# Patient Record
Sex: FEMALE | Race: WHITE | NOT HISPANIC OR LATINO | Employment: FULL TIME | URBAN - METROPOLITAN AREA
[De-identification: names, ages, dates, MRNs, and addresses within clinical notes are randomized per-mention and may not be internally consistent; named-entity substitution may affect disease eponyms.]

---

## 2019-11-22 ENCOUNTER — OFFICE VISIT (OUTPATIENT)
Dept: PRIMARY CARE CLINIC | Facility: CLINIC | Age: 54
End: 2019-11-22
Payer: COMMERCIAL

## 2019-11-22 VITALS
BODY MASS INDEX: 23.71 KG/M2 | HEIGHT: 64 IN | OXYGEN SATURATION: 97 % | WEIGHT: 138.88 LBS | SYSTOLIC BLOOD PRESSURE: 116 MMHG | HEART RATE: 77 BPM | TEMPERATURE: 98 F | DIASTOLIC BLOOD PRESSURE: 80 MMHG

## 2019-11-22 DIAGNOSIS — Z23 NEED FOR VACCINATION: Primary | ICD-10-CM

## 2019-11-22 DIAGNOSIS — Z00.00 ANNUAL PHYSICAL EXAM: ICD-10-CM

## 2019-11-22 DIAGNOSIS — K21.9 GERD WITHOUT ESOPHAGITIS: ICD-10-CM

## 2019-11-22 PROCEDURE — 99396 PR PREVENTIVE VISIT,EST,40-64: ICD-10-PCS | Mod: 25,S$GLB,, | Performed by: INTERNAL MEDICINE

## 2019-11-22 PROCEDURE — 99999 PR PBB SHADOW E&M-NEW PATIENT-LVL III: ICD-10-PCS | Mod: PBBFAC,,, | Performed by: INTERNAL MEDICINE

## 2019-11-22 PROCEDURE — 90471 TDAP VACCINE GREATER THAN OR EQUAL TO 7YO IM: ICD-10-PCS | Mod: S$GLB,,, | Performed by: INTERNAL MEDICINE

## 2019-11-22 PROCEDURE — 99999 PR PBB SHADOW E&M-NEW PATIENT-LVL III: CPT | Mod: PBBFAC,,, | Performed by: INTERNAL MEDICINE

## 2019-11-22 PROCEDURE — 90715 TDAP VACCINE GREATER THAN OR EQUAL TO 7YO IM: ICD-10-PCS | Mod: S$GLB,,, | Performed by: INTERNAL MEDICINE

## 2019-11-22 PROCEDURE — 99396 PREV VISIT EST AGE 40-64: CPT | Mod: 25,S$GLB,, | Performed by: INTERNAL MEDICINE

## 2019-11-22 PROCEDURE — 90471 IMMUNIZATION ADMIN: CPT | Mod: S$GLB,,, | Performed by: INTERNAL MEDICINE

## 2019-11-22 PROCEDURE — 90715 TDAP VACCINE 7 YRS/> IM: CPT | Mod: S$GLB,,, | Performed by: INTERNAL MEDICINE

## 2019-11-22 RX ORDER — ACETAMINOPHEN AND PHENYLEPHRINE HCL 325; 5 MG/1; MG/1
1 TABLET ORAL DAILY
COMMUNITY
End: 2023-06-23

## 2019-11-22 RX ORDER — CHOLECALCIFEROL (VITAMIN D3) 50 MCG
1 TABLET ORAL DAILY
COMMUNITY
End: 2022-02-03

## 2019-11-22 RX ORDER — PANTOPRAZOLE SODIUM 40 MG/1
1 TABLET, DELAYED RELEASE ORAL DAILY
COMMUNITY
Start: 2019-11-18 | End: 2021-04-23 | Stop reason: ALTCHOICE

## 2019-11-22 NOTE — PROGRESS NOTES
[facility] Note    Chief Complaint      Chief Complaint   Patient presents with    Annual Exam       History of Present Illness      Melina Jean is a 54 y.o. female with chronic conditions of GERD who presents today for: re-establish care from  and annual preventative visit.  GERD: Controlled on protonix daily.  Had EGD yesterday with Dr. Cerrato.    Diet: Prepares own food mostly.  Limiting fatty foods and carbs.  Drinks plenty water.    Exercise: stays active.  Spin class 2x/week.    Denies drinking and driving, drinking more than 4 drinks on occasion, drug use.     Flu shot UTD.  Td unsure.  Shingrix UTD.  Pneumonia vaccine due age 67.  Mammogram 2019.  Sees Dr. Qureshi.  DEXA UTD 2018.  Requesting record.  Cscope Dr. Cerrato, 2019, no polyps, 5 yr interval for family history of colon cancer in mother.      Past Medical History:  No past medical history on file.    Past Surgical History:   has a past surgical history that includes Cholecystectomy and Tonsillectomy.    Family History:  family history includes Colon cancer in her mother; No Known Problems in her brother and father.     Social History:  Social History     Tobacco Use    Smoking status: Never Smoker   Substance Use Topics    Alcohol use: Not on file    Drug use: Not on file       Review of Systems   HENT: Negative for hearing loss.    Eyes: Negative for discharge.   Respiratory: Negative for wheezing.    Cardiovascular: Negative for chest pain and palpitations.   Gastrointestinal: Negative for blood in stool, constipation, diarrhea and vomiting.   Genitourinary: Negative for dysuria and hematuria.   Musculoskeletal: Negative for neck pain.   Neurological: Negative for weakness and headaches.   Endo/Heme/Allergies: Negative for polydipsia.        Medications:  Outpatient Encounter Medications as of 11/22/2019   Medication Sig Dispense Refill    biotin 10,000 mcg Cap Take 1 capsule by mouth once daily.      CALCIUM CITRATE  "ORAL Take 300 mg by mouth once daily.      calcium polycarbophil (FIBERCON ORAL) Take 1 tablet by mouth once daily.      cholecalciferol, vitamin D3, (VITAMIN D3) 2,000 unit Tab Take 1 tablet by mouth once daily.      pantoprazole (PROTONIX) 40 MG tablet Take 1 tablet by mouth once daily.       No facility-administered encounter medications on file as of 11/22/2019.        Allergies:  Review of patient's allergies indicates:  No Known Allergies    Health Maintenance:  Immunization History   Administered Date(s) Administered    Zoster Recombinant 11/28/2018      Health Maintenance   Topic Date Due    Lipid Panel  1965    TETANUS VACCINE  06/21/1985    Pap Smear with HPV Cotest  06/21/1988    Mammogram  06/21/2007    Colonoscopy  06/21/2017        Physical Exam      Vital Signs  Temp: 98 °F (36.7 °C)  Temp src: Oral  Pulse: 77  SpO2: 97 %  BP: 116/80  BP Location: Left arm  Patient Position: Sitting  Pain Score: 0-No pain  Height and Weight  Height: 5' 3.5" (161.3 cm)  Weight: 63 kg (138 lb 14.2 oz)  BSA (Calculated - sq m): 1.68 sq meters  BMI (Calculated): 24.2  Weight in (lb) to have BMI = 25: 143.1]    Physical Exam   Constitutional: She appears well-developed and well-nourished.   HENT:   Head: Normocephalic and atraumatic.   Right Ear: External ear normal.   Left Ear: External ear normal.   Mouth/Throat: Oropharynx is clear and moist.   Eyes: Pupils are equal, round, and reactive to light. Conjunctivae and EOM are normal.   Neck: Carotid bruit is not present.   Cardiovascular: Normal rate, regular rhythm, normal heart sounds and intact distal pulses.   No murmur heard.  Pulmonary/Chest: Effort normal and breath sounds normal. She has no wheezes. She has no rales.   Abdominal: Soft. Bowel sounds are normal. She exhibits no distension. There is no hepatosplenomegaly. There is no tenderness.   Musculoskeletal: She exhibits no edema.   Vitals reviewed.       Laboratory:  CBC:      CMP:        Invalid " input(s): CREATININ  URINALYSIS:       LIPIDS:      TSH:      A1C:        Assessment/Plan     Melina Jean is a 54 y.o.female with:    1. Annual physical exam  - Basic metabolic panel; Future  - Lipid panel; Future  - TSH; Future  - T4, free; Future  - Basic metabolic panel  - Lipid panel  - TSH  - T4, free  Discussed diet and exercise, vaccines and cancer screening, risk factors.  Screening labs ordered.     2. GERD without esophagitis  - Basic metabolic panel; Future  - Lipid panel; Future  - TSH; Future  - T4, free; Future  - Basic metabolic panel  - Lipid panel  - TSH  - T4, free  Continue current meds.  F/U with DR. Cerrato  3. Need for vaccination  - Tdap Vaccine       Chronic conditions status updated as per HPI.  Other than changes above, cont current medications and maintain follow up with specialists.  Return to clinic in 12 months.    Eliud Gerard MD  [facility]                Answers for HPI/ROS submitted by the patient on 11/21/2019   activity change: No  unexpected weight change: No  rhinorrhea: No  trouble swallowing: No  visual disturbance: No  chest tightness: No  polyuria: No  difficulty urinating: No  menstrual problem: No  joint swelling: No  arthralgias: No  confusion: No  dysphoric mood: No

## 2019-11-22 NOTE — PROGRESS NOTES
"Patient was given vaccine information sheet for the Tdap immunization. The area of injection was palpated using the acromion process as a landmark. This area was cleaned with alcohol. Using a 25g 1" safety needle, 0.5mL of the vaccine was placed into the left muscle. The injection site was dressed with a bandage. Patient experienced no complications and was discharged in stable condition. Tdap Lot: [id number] Exp: 05/15/2021.  Roz Becerril LPN        "

## 2019-11-24 LAB
CHOLEST SERPL-MCNC: 170 MG/DL
CHOLEST/HDLC SERPL: 2.3 (CALC)
HDLC SERPL-MCNC: 75 MG/DL
LDLC SERPL CALC-MCNC: 81 MG/DL (CALC)
NONHDLC SERPL-MCNC: 95 MG/DL (CALC)
T4 FREE SERPL-MCNC: 1.1 NG/DL (ref 0.8–1.8)
TRIGL SERPL-MCNC: 68 MG/DL
TSH SERPL-ACNC: 1.35 MIU/L

## 2019-11-25 ENCOUNTER — PATIENT OUTREACH (OUTPATIENT)
Dept: ADMINISTRATIVE | Facility: HOSPITAL | Age: 54
End: 2019-11-25

## 2019-11-27 ENCOUNTER — TELEPHONE (OUTPATIENT)
Dept: FAMILY MEDICINE | Facility: CLINIC | Age: 54
End: 2019-11-27

## 2019-11-27 NOTE — TELEPHONE ENCOUNTER
----- Message from Roxanne Jimenez sent at 11/27/2019  2:19 PM CST -----  Contact: Jacob Demarco missed a call from staff. Pt is request a call back on her cell [phone]

## 2020-01-03 ENCOUNTER — HOSPITAL ENCOUNTER (OUTPATIENT)
Dept: RADIOLOGY | Facility: HOSPITAL | Age: 55
Discharge: HOME | End: 2020-01-03
Attending: INTERNAL MEDICINE
Payer: COMMERCIAL

## 2020-01-03 ENCOUNTER — TELEPHONE (OUTPATIENT)
Dept: FAMILY MEDICINE | Facility: CLINIC | Age: 55
End: 2020-01-03

## 2020-01-03 ENCOUNTER — OFFICE VISIT (OUTPATIENT)
Dept: PRIMARY CARE CLINIC | Facility: CLINIC | Age: 55
End: 2020-01-03
Payer: COMMERCIAL

## 2020-01-03 VITALS
HEART RATE: 80 BPM | HEIGHT: 64 IN | SYSTOLIC BLOOD PRESSURE: 114 MMHG | OXYGEN SATURATION: 98 % | BODY MASS INDEX: 24.62 KG/M2 | DIASTOLIC BLOOD PRESSURE: 82 MMHG | WEIGHT: 144.19 LBS | TEMPERATURE: 98 F

## 2020-01-03 DIAGNOSIS — M10.9 GOUT, UNSPECIFIED CAUSE, UNSPECIFIED CHRONICITY, UNSPECIFIED SITE: ICD-10-CM

## 2020-01-03 DIAGNOSIS — M79.672 BILATERAL FOOT PAIN: ICD-10-CM

## 2020-01-03 DIAGNOSIS — M79.671 BILATERAL FOOT PAIN: ICD-10-CM

## 2020-01-03 DIAGNOSIS — M79.645 PAIN OF FINGER OF LEFT HAND: Primary | ICD-10-CM

## 2020-01-03 DIAGNOSIS — M79.645 PAIN OF FINGER OF LEFT HAND: ICD-10-CM

## 2020-01-03 PROCEDURE — 73630 X-RAY EXAM OF FOOT: CPT | Mod: 50,TC,PN

## 2020-01-03 PROCEDURE — 73130 X-RAY EXAM OF HAND: CPT | Mod: TC,PN,LT

## 2020-01-03 PROCEDURE — 73630 X-RAY EXAM OF FOOT: CPT | Mod: 26,,, | Performed by: RADIOLOGY

## 2020-01-03 PROCEDURE — 3008F BODY MASS INDEX DOCD: CPT | Mod: CPTII,S$GLB,, | Performed by: INTERNAL MEDICINE

## 2020-01-03 PROCEDURE — 73630 XR FOOT COMPLETE 3 VIEW BILATERAL: ICD-10-PCS | Mod: 26,,, | Performed by: RADIOLOGY

## 2020-01-03 PROCEDURE — 99213 OFFICE O/P EST LOW 20 MIN: CPT | Mod: S$GLB,,, | Performed by: INTERNAL MEDICINE

## 2020-01-03 PROCEDURE — 99999 PR PBB SHADOW E&M-EST. PATIENT-LVL III: ICD-10-PCS | Mod: PBBFAC,,, | Performed by: INTERNAL MEDICINE

## 2020-01-03 PROCEDURE — 99999 PR PBB SHADOW E&M-EST. PATIENT-LVL III: CPT | Mod: PBBFAC,,, | Performed by: INTERNAL MEDICINE

## 2020-01-03 PROCEDURE — 3008F PR BODY MASS INDEX (BMI) DOCUMENTED: ICD-10-PCS | Mod: CPTII,S$GLB,, | Performed by: INTERNAL MEDICINE

## 2020-01-03 PROCEDURE — 73130 X-RAY EXAM OF HAND: CPT | Mod: 26,LT,, | Performed by: RADIOLOGY

## 2020-01-03 PROCEDURE — 73130 XR HAND COMPLETE 3 VIEW LEFT: ICD-10-PCS | Mod: 26,LT,, | Performed by: RADIOLOGY

## 2020-01-03 PROCEDURE — 99213 PR OFFICE/OUTPT VISIT, EST, LEVL III, 20-29 MIN: ICD-10-PCS | Mod: S$GLB,,, | Performed by: INTERNAL MEDICINE

## 2020-01-03 NOTE — PROGRESS NOTES
[facility] Note    Chief Complaint      Chief Complaint   Patient presents with    Joint pain     left thumb and both feet       History of Present Illness      Melina Jean is a 54 y.o. female with chronic conditions of GERD who presents today for: complaints of joint pains.  Complains of left thumb PIP swelling.  Associated with pain with ROM and tenderness to touch.  Also having similar symptoms with bilateral great toes, MTPs.  Has never had gout diagnosed previously.  Does not eat shellfish regularly.  Does drink beer and had some day prior to toes getting inflamed.        Past Medical History:  No past medical history on file.    Past Surgical History:   has a past surgical history that includes Cholecystectomy and Tonsillectomy.    Family History:  family history includes COPD in her mother; Cancer in her mother; Colon cancer in her mother; No Known Problems in her brother and father.     Social History:  Social History     Tobacco Use    Smoking status: Never Smoker   Substance Use Topics    Alcohol use: Yes     Alcohol/week: 5.0 standard drinks     Types: 1 Glasses of wine, 3 Cans of beer, 1 Standard drinks or equivalent per week     Comment: Socially not every week    Drug use: Never       Review of Systems   Constitutional: Negative for chills, fever and malaise/fatigue.   HENT: Negative for hearing loss.    Eyes: Negative for discharge.   Respiratory: Negative for shortness of breath and wheezing.    Cardiovascular: Negative for chest pain and palpitations.   Gastrointestinal: Negative for blood in stool, constipation, diarrhea, nausea and vomiting.   Genitourinary: Negative for dysuria and hematuria.   Musculoskeletal: Positive for joint pain. Negative for neck pain.   Skin: Negative for rash.   Neurological: Negative for weakness and headaches.   Endo/Heme/Allergies: Negative for polydipsia.        Medications:  Outpatient Encounter Medications as of 1/3/2020   Medication Sig  "Dispense Refill    biotin 10,000 mcg Cap Take 1 capsule by mouth once daily.      CALCIUM CITRATE ORAL Take 300 mg by mouth once daily.      calcium polycarbophil (FIBERCON ORAL) Take 1 tablet by mouth once daily.      cholecalciferol, vitamin D3, (VITAMIN D3) 2,000 unit Tab Take 1 tablet by mouth once daily.      pantoprazole (PROTONIX) 40 MG tablet Take 1 tablet by mouth once daily.       No facility-administered encounter medications on file as of 1/3/2020.        Allergies:  Review of patient's allergies indicates:  No Known Allergies    Health Maintenance:  Immunization History   Administered Date(s) Administered    Influenza - Quadrivalent 10/03/2017    Influenza - Quadrivalent - PF (6 months and older) 09/24/2019    Tdap 11/22/2019    Zoster Recombinant 09/27/2018, 11/29/2018      Health Maintenance   Topic Date Due    Pap Smear with HPV Cotest  06/21/1988    Mammogram  12/18/2020    Colonoscopy  11/21/2024    Lipid Panel  11/23/2024    TETANUS VACCINE  11/22/2029        Physical Exam      Vital Signs  Temp: 98.4 °F (36.9 °C)  Temp src: Oral  Pulse: 80  SpO2: 98 %  BP: 114/82  BP Location: Right arm  Patient Position: Sitting  Pain Score: 0-No pain  Height and Weight  Height: 5' 3.5" (161.3 cm)  Weight: 65.4 kg (144 lb 2.9 oz)  BSA (Calculated - sq m): 1.71 sq meters  BMI (Calculated): 25.1  Weight in (lb) to have BMI = 25: 143.1]    Physical Exam   Constitutional: She appears well-developed and well-nourished.   HENT:   Head: Normocephalic and atraumatic.   Right Ear: External ear normal.   Left Ear: External ear normal.   Mouth/Throat: Oropharynx is clear and moist.   Cardiovascular: Normal rate, regular rhythm and normal heart sounds.   No murmur heard.  Pulmonary/Chest: Effort normal and breath sounds normal. She has no wheezes. She has no rales.   Abdominal: Soft. Bowel sounds are normal. She exhibits no distension. There is no tenderness.   Musculoskeletal:        Left hand: She exhibits " tenderness (Swollen, tender DIP Left 1st digit). She exhibits normal range of motion and normal capillary refill.   Bilateral great toes normal in appearance, no tenderness, no erythema, no swelling   Vitals reviewed.       Laboratory:  CBC:      CMP:        Invalid input(s): CREATININ  URINALYSIS:       LIPIDS:  Recent Labs   Lab 11/23/19  0813   TSH 1.35   HDL 75   Cholesterol 170   Triglycerides 68   LDL Cholesterol 81   Hdl/Cholesterol Ratio 2.3   Non HDL Chol. (LDL+VLDL) 95     TSH:  Recent Labs   Lab 11/23/19 0813   TSH 1.35     A1C:        Assessment/Plan     Melina Jean is a 54 y.o.female with:    1. Pain of finger of left hand  - X-Ray Hand Complete Left; Future  - Uric acid; Future  - Uric acid    2. Bilateral foot pain  - X-Ray Foot Complete Bilateral; Future  - Uric acid; Future  - Uric acid    3. Gout, unspecified cause, unspecified chronicity, unspecified site  - X-Ray Foot Complete Bilateral; Future  - X-Ray Hand Complete Left; Future  - Uric acid; Future  - Uric acid       Will check uric acid and xrays.  COunswelled on low purine diet in the meantime.  Ok to use aleve as needed until etiology is discovered.  RTC as scheduled.      Eliud Gerard MD  [facility]                Answers for HPI/ROS submitted by the patient on 1/1/2020   activity change: No  unexpected weight change: No  rhinorrhea: No  trouble swallowing: No  visual disturbance: No  chest tightness: No  polyuria: No  difficulty urinating: No  menstrual problem: No  joint swelling: Yes  arthralgias: Yes  confusion: No  dysphoric mood: No

## 2020-01-03 NOTE — TELEPHONE ENCOUNTER
----- Message from Venessa Krishnan sent at 1/3/2020  4:32 PM CST -----  Contact: [phone]/self  Patient states she is returning your call. Please advise.

## 2020-01-03 NOTE — PROGRESS NOTES
Xray of the hand shows mild arthritis at the base of the thumb but nothing significant at the inflamed joint of the thumb.  This makes gout more likely.  Once we get the results of the uric acid to complete the workup, we can then recommend treatments.

## 2020-01-04 LAB — URATE SERPL-MCNC: 3.6 MG/DL (ref 2.5–7)

## 2020-01-06 DIAGNOSIS — M79.672 BILATERAL FOOT PAIN: ICD-10-CM

## 2020-01-06 DIAGNOSIS — M79.645 PAIN OF FINGER OF LEFT HAND: Primary | ICD-10-CM

## 2020-01-06 DIAGNOSIS — M79.671 BILATERAL FOOT PAIN: ICD-10-CM

## 2020-01-06 NOTE — PROGRESS NOTES
Uric acid is normal at this time.  This makes it less likely to be gout but not impossible.  It is possible that uric acid levels rise depending on what she is eating and therefore finger and toe pain could still be gout.  I would recommend taking aleve OTC 1 tablet twice daily for 7 days to calm down inflammation and if that improves finger pain, then use aleve as needed.  Try adhering to low purine diet as discussed during office visit.  Recommend to recheck uric acid in 1-3 months.

## 2020-03-10 ENCOUNTER — PATIENT MESSAGE (OUTPATIENT)
Dept: PRIMARY CARE CLINIC | Facility: CLINIC | Age: 55
End: 2020-03-10

## 2020-03-12 RX ORDER — CLONIDINE HYDROCHLORIDE 0.2 MG/1
0.2 TABLET ORAL ONCE
Qty: 30 TABLET | Refills: 0
Start: 2020-03-12 | End: 2021-02-11

## 2020-04-13 ENCOUNTER — OFFICE VISIT (OUTPATIENT)
Dept: PRIMARY CARE CLINIC | Facility: CLINIC | Age: 55
End: 2020-04-13
Payer: COMMERCIAL

## 2020-04-13 DIAGNOSIS — M10.9 GOUT, UNSPECIFIED CAUSE, UNSPECIFIED CHRONICITY, UNSPECIFIED SITE: Primary | ICD-10-CM

## 2020-04-13 DIAGNOSIS — R22.1 LOCALIZED SWELLING, MASS OR LUMP OF NECK: ICD-10-CM

## 2020-04-13 PROCEDURE — 99213 OFFICE O/P EST LOW 20 MIN: CPT | Mod: 95,,, | Performed by: INTERNAL MEDICINE

## 2020-04-13 PROCEDURE — 99213 PR OFFICE/OUTPT VISIT, EST, LEVL III, 20-29 MIN: ICD-10-PCS | Mod: 95,,, | Performed by: INTERNAL MEDICINE

## 2020-04-13 RX ORDER — HYDROCODONE BITARTRATE AND ACETAMINOPHEN 5; 325 MG/1; MG/1
1 TABLET ORAL EVERY 12 HOURS PRN
COMMUNITY
End: 2020-04-13 | Stop reason: SDUPTHER

## 2020-04-13 RX ORDER — HYDROCODONE BITARTRATE AND ACETAMINOPHEN 5; 325 MG/1; MG/1
1 TABLET ORAL EVERY 12 HOURS PRN
Qty: 10 TABLET | Refills: 0 | Status: SHIPPED | OUTPATIENT
Start: 2020-04-13 | End: 2021-02-11

## 2020-04-13 NOTE — PROGRESS NOTES
[facility] Virtual Visit Note    The patient location is: Home  The chief complaint leading to consultation is: right swollen cervical lymph node  Visit type: Virtual visit with synchronous audio and video  Total time spent with patient: 15 min  Each patient to whom he or she provides medical services by telemedicine is:  (1) informed of the relationship between the physician and patient and the respective role of any other health care provider with respect to management of the patient; and (2) notified that he or she may decline to receive medical services by telemedicine and may withdraw from such care at any time.      Chief Complaint      Chief Complaint   Patient presents with    Neck Pain       History of Present Illness      Melina Jean is a 54 y.o. female with chronic conditions of GERD who presents today for: right anterior cervical lymph node swelling and tenderness. Has  Present for the past 3-4 days.  Seems to be improving in the past 24-48 hours.  No associated fevers, difficulty swallowing, cough, shortness of breath.  Pt did have sinusitis treated by ENT with antibiotics in early March.  Was not tested for COVID at that time due to restricted indications.    Pt also has prescription for hydrocodone for low back pain and gout exacerbations.  Has run out and asking for refill.  Last refill 2018.      Past Medical History:  History reviewed. No pertinent past medical history.    Past Surgical History:   has a past surgical history that includes Cholecystectomy and Tonsillectomy.    Family History:  family history includes COPD in her mother; Cancer in her mother; Colon cancer in her mother; No Known Problems in her brother and father.     Social History:  Social History     Tobacco Use    Smoking status: Never Smoker   Substance Use Topics    Alcohol use: Yes     Alcohol/week: 5.0 standard drinks     Types: 1 Glasses of wine, 3 Cans of beer, 1 Standard drinks or equivalent per week      Comment: Socially not every week    Drug use: Never       Review of Systems   Constitutional: Negative for chills, fever and malaise/fatigue.   HENT:        Enlarged right anterior cervical lymph node   Respiratory: Negative for shortness of breath.    Cardiovascular: Negative for chest pain.   Gastrointestinal: Negative for constipation, diarrhea, nausea and vomiting.   Musculoskeletal: Positive for neck pain.   Skin: Negative for rash.   Neurological: Negative for weakness.        Medications:  Outpatient Encounter Medications as of 4/13/2020   Medication Sig Dispense Refill    HYDROcodone-acetaminophen (NORCO) 5-325 mg per tablet Take 1 tablet by mouth every 12 (twelve) hours as needed. 10 tablet 0    [DISCONTINUED] HYDROcodone-acetaminophen (NORCO) 5-325 mg per tablet Take 1 tablet by mouth every 12 (twelve) hours as needed.      biotin 10,000 mcg Cap Take 1 capsule by mouth once daily.      CALCIUM CITRATE ORAL Take 300 mg by mouth once daily.      calcium polycarbophil (FIBERCON ORAL) Take 1 tablet by mouth once daily.      cholecalciferol, vitamin D3, (VITAMIN D3) 2,000 unit Tab Take 1 tablet by mouth once daily.      cloNIDine (CATAPRES) 0.2 MG tablet Take 1 tablet (0.2 mg total) by mouth once. for 1 dose 30 tablet 0    pantoprazole (PROTONIX) 40 MG tablet Take 1 tablet by mouth once daily.       No facility-administered encounter medications on file as of 4/13/2020.        Allergies:  Review of patient's allergies indicates:  No Known Allergies    Health Maintenance:  Immunization History   Administered Date(s) Administered    Influenza - Quadrivalent 10/03/2017    Influenza - Quadrivalent - PF (6 months and older) 09/24/2019    Tdap 11/22/2019    Zoster Recombinant 09/27/2018, 11/29/2018      Health Maintenance   Topic Date Due    Pap Smear with HPV Cotest  06/21/1988    Mammogram  12/18/2020    Colonoscopy  11/21/2024    Lipid Panel  11/23/2024    TETANUS VACCINE  11/22/2029         Physical Exam       ]    Physical Exam   Constitutional: She is oriented to person, place, and time. She appears well-developed and well-nourished. No distress.   Eyes: Conjunctivae and EOM are normal. Right eye exhibits no discharge. Left eye exhibits no discharge. No scleral icterus.   Pulmonary/Chest: Effort normal. No respiratory distress.   Lymphadenopathy:     She has cervical adenopathy (Pt localizes right anterior cervical swelling, estimates size 1-2 inches, firm, slightly tender, mobile).   Neurological: She is alert and oriented to person, place, and time.   Skin: She is not diaphoretic.   Psychiatric: She has a normal mood and affect. Her behavior is normal. Judgment and thought content normal.        Laboratory:  CBC:      CMP:        Invalid input(s): CREATININ  URINALYSIS:       LIPIDS:  Recent Labs   Lab 11/23/19  0813   TSH 1.35   HDL 75   Cholesterol 170   Triglycerides 68   LDL Cholesterol 81   Hdl/Cholesterol Ratio 2.3   Non HDL Chol. (LDL+VLDL) 95     TSH:  Recent Labs   Lab 11/23/19  0813   TSH 1.35     A1C:        Assessment/Plan     Melina Jean is a 54 y.o.female with:    1. Localized swelling, mass or lump of neck, new to me  Cont to monitor.  Seems to be improving.  If still present in 1 month, will send for ultrasound.  2. Gout, unspecified cause, unspecified chronicity, unspecified site  - HYDROcodone-acetaminophen (NORCO) 5-325 mg per tablet; Take 1 tablet by mouth every 12 (twelve) hours as needed.  Dispense: 10 tablet; Refill: 0       Chronic conditions status updated as per HPI.  Other than changes above, cont current medications and maintain follow up with specialists.  Return to clinic as scheduled.    Eliud Gerard MD  [facility]

## 2020-04-15 ENCOUNTER — PATIENT OUTREACH (OUTPATIENT)
Dept: ADMINISTRATIVE | Facility: HOSPITAL | Age: 55
End: 2020-04-15

## 2020-04-15 ENCOUNTER — TELEPHONE (OUTPATIENT)
Dept: FAMILY MEDICINE | Facility: CLINIC | Age: 55
End: 2020-04-15

## 2020-04-15 ENCOUNTER — TELEPHONE (OUTPATIENT)
Dept: ADMINISTRATIVE | Facility: HOSPITAL | Age: 55
End: 2020-04-15

## 2020-04-21 ENCOUNTER — TELEPHONE (OUTPATIENT)
Dept: ADMINISTRATIVE | Facility: HOSPITAL | Age: 55
End: 2020-04-21

## 2020-04-21 ENCOUNTER — PATIENT OUTREACH (OUTPATIENT)
Dept: ADMINISTRATIVE | Facility: HOSPITAL | Age: 55
End: 2020-04-21

## 2021-02-10 ENCOUNTER — PATIENT OUTREACH (OUTPATIENT)
Dept: ADMINISTRATIVE | Facility: OTHER | Age: 56
End: 2021-02-10

## 2021-02-10 DIAGNOSIS — Z12.31 ENCOUNTER FOR SCREENING MAMMOGRAM FOR MALIGNANT NEOPLASM OF BREAST: Primary | ICD-10-CM

## 2021-02-11 ENCOUNTER — OFFICE VISIT (OUTPATIENT)
Dept: OBSTETRICS AND GYNECOLOGY | Facility: CLINIC | Age: 56
End: 2021-02-11
Attending: STUDENT IN AN ORGANIZED HEALTH CARE EDUCATION/TRAINING PROGRAM
Payer: COMMERCIAL

## 2021-02-11 VITALS
WEIGHT: 137.13 LBS | BODY MASS INDEX: 23.41 KG/M2 | SYSTOLIC BLOOD PRESSURE: 110 MMHG | DIASTOLIC BLOOD PRESSURE: 76 MMHG | HEIGHT: 64 IN

## 2021-02-11 DIAGNOSIS — Z01.419 WELL WOMAN EXAM: ICD-10-CM

## 2021-02-11 DIAGNOSIS — Z11.51 SCREENING FOR HPV (HUMAN PAPILLOMAVIRUS): ICD-10-CM

## 2021-02-11 DIAGNOSIS — Z12.31 VISIT FOR SCREENING MAMMOGRAM: Primary | ICD-10-CM

## 2021-02-11 DIAGNOSIS — Z13.820 SCREENING FOR OSTEOPOROSIS: ICD-10-CM

## 2021-02-11 DIAGNOSIS — Z12.4 SCREENING FOR CERVICAL CANCER: ICD-10-CM

## 2021-02-11 PROCEDURE — 99999 PR PBB SHADOW E&M-EST. PATIENT-LVL IV: ICD-10-PCS | Mod: PBBFAC,,, | Performed by: STUDENT IN AN ORGANIZED HEALTH CARE EDUCATION/TRAINING PROGRAM

## 2021-02-11 PROCEDURE — 99999 PR PBB SHADOW E&M-EST. PATIENT-LVL IV: CPT | Mod: PBBFAC,,, | Performed by: STUDENT IN AN ORGANIZED HEALTH CARE EDUCATION/TRAINING PROGRAM

## 2021-02-11 PROCEDURE — 99386 PR PREVENTIVE VISIT,NEW,40-64: ICD-10-PCS | Mod: S$GLB,,, | Performed by: STUDENT IN AN ORGANIZED HEALTH CARE EDUCATION/TRAINING PROGRAM

## 2021-02-11 PROCEDURE — 1126F PR PAIN SEVERITY QUANTIFIED, NO PAIN PRESENT: ICD-10-PCS | Mod: S$GLB,,, | Performed by: STUDENT IN AN ORGANIZED HEALTH CARE EDUCATION/TRAINING PROGRAM

## 2021-02-11 PROCEDURE — 3008F BODY MASS INDEX DOCD: CPT | Mod: CPTII,S$GLB,, | Performed by: STUDENT IN AN ORGANIZED HEALTH CARE EDUCATION/TRAINING PROGRAM

## 2021-02-11 PROCEDURE — 88175 CYTOPATH C/V AUTO FLUID REDO: CPT | Performed by: STUDENT IN AN ORGANIZED HEALTH CARE EDUCATION/TRAINING PROGRAM

## 2021-02-11 PROCEDURE — 1126F AMNT PAIN NOTED NONE PRSNT: CPT | Mod: S$GLB,,, | Performed by: STUDENT IN AN ORGANIZED HEALTH CARE EDUCATION/TRAINING PROGRAM

## 2021-02-11 PROCEDURE — 99386 PREV VISIT NEW AGE 40-64: CPT | Mod: S$GLB,,, | Performed by: STUDENT IN AN ORGANIZED HEALTH CARE EDUCATION/TRAINING PROGRAM

## 2021-02-11 PROCEDURE — 87624 HPV HI-RISK TYP POOLED RSLT: CPT

## 2021-02-11 PROCEDURE — 3008F PR BODY MASS INDEX (BMI) DOCUMENTED: ICD-10-PCS | Mod: CPTII,S$GLB,, | Performed by: STUDENT IN AN ORGANIZED HEALTH CARE EDUCATION/TRAINING PROGRAM

## 2021-02-11 RX ORDER — FLUTICASONE PROPIONATE 50 MCG
2 SPRAY, SUSPENSION (ML) NASAL
COMMUNITY
Start: 2021-01-18 | End: 2023-04-05

## 2021-02-11 RX ORDER — GABAPENTIN 100 MG/1
CAPSULE ORAL
COMMUNITY
Start: 2020-11-04 | End: 2022-02-03

## 2021-02-11 RX ORDER — HYDROCORTISONE 1 %
GEL (GRAM) TOPICAL DAILY
COMMUNITY
Start: 2020-12-13 | End: 2023-06-23

## 2021-02-11 RX ORDER — ASCORBIC ACID 500 MG
TABLET,CHEWABLE ORAL DAILY
COMMUNITY
Start: 2019-10-16

## 2021-02-26 LAB
HPV HR 12 DNA SPEC QL NAA+PROBE: NEGATIVE
HPV16 AG SPEC QL: NEGATIVE
HPV18 DNA SPEC QL NAA+PROBE: NEGATIVE

## 2021-03-09 LAB
FINAL PATHOLOGIC DIAGNOSIS: NORMAL
Lab: NORMAL

## 2021-03-16 ENCOUNTER — TELEPHONE (OUTPATIENT)
Dept: ADMINISTRATIVE | Facility: HOSPITAL | Age: 56
End: 2021-03-16

## 2021-03-16 ENCOUNTER — PATIENT MESSAGE (OUTPATIENT)
Dept: PRIMARY CARE CLINIC | Facility: CLINIC | Age: 56
End: 2021-03-16

## 2021-03-16 ENCOUNTER — PATIENT OUTREACH (OUTPATIENT)
Dept: ADMINISTRATIVE | Facility: HOSPITAL | Age: 56
End: 2021-03-16

## 2021-03-18 ENCOUNTER — PATIENT MESSAGE (OUTPATIENT)
Dept: FAMILY MEDICINE | Facility: CLINIC | Age: 56
End: 2021-03-18

## 2021-03-23 ENCOUNTER — TELEPHONE (OUTPATIENT)
Dept: OBSTETRICS AND GYNECOLOGY | Facility: HOSPITAL | Age: 56
End: 2021-03-23

## 2021-03-23 DIAGNOSIS — M81.0 OSTEOPOROSIS, UNSPECIFIED OSTEOPOROSIS TYPE, UNSPECIFIED PATHOLOGICAL FRACTURE PRESENCE: Primary | ICD-10-CM

## 2021-03-26 ENCOUNTER — TELEPHONE (OUTPATIENT)
Dept: OBSTETRICS AND GYNECOLOGY | Facility: CLINIC | Age: 56
End: 2021-03-26

## 2021-03-27 ENCOUNTER — PATIENT MESSAGE (OUTPATIENT)
Dept: OBSTETRICS AND GYNECOLOGY | Facility: CLINIC | Age: 56
End: 2021-03-27

## 2021-04-23 ENCOUNTER — OFFICE VISIT (OUTPATIENT)
Dept: PRIMARY CARE CLINIC | Facility: CLINIC | Age: 56
End: 2021-04-23
Payer: COMMERCIAL

## 2021-04-23 ENCOUNTER — TELEPHONE (OUTPATIENT)
Dept: PRIMARY CARE CLINIC | Facility: CLINIC | Age: 56
End: 2021-04-23

## 2021-04-23 VITALS
SYSTOLIC BLOOD PRESSURE: 116 MMHG | HEART RATE: 65 BPM | HEIGHT: 64 IN | DIASTOLIC BLOOD PRESSURE: 82 MMHG | BODY MASS INDEX: 22.7 KG/M2 | OXYGEN SATURATION: 99 % | WEIGHT: 132.94 LBS

## 2021-04-23 DIAGNOSIS — Z00.00 ANNUAL PHYSICAL EXAM: Primary | ICD-10-CM

## 2021-04-23 DIAGNOSIS — Z11.59 SCREENING FOR VIRAL DISEASE: ICD-10-CM

## 2021-04-23 DIAGNOSIS — Z11.4 SCREENING FOR HIV (HUMAN IMMUNODEFICIENCY VIRUS): ICD-10-CM

## 2021-04-23 DIAGNOSIS — M81.0 AGE-RELATED OSTEOPOROSIS WITHOUT CURRENT PATHOLOGICAL FRACTURE: Primary | ICD-10-CM

## 2021-04-23 DIAGNOSIS — K21.9 GERD WITHOUT ESOPHAGITIS: ICD-10-CM

## 2021-04-23 PROCEDURE — 3008F BODY MASS INDEX DOCD: CPT | Mod: CPTII,S$GLB,, | Performed by: INTERNAL MEDICINE

## 2021-04-23 PROCEDURE — 99999 PR PBB SHADOW E&M-EST. PATIENT-LVL III: CPT | Mod: PBBFAC,,, | Performed by: INTERNAL MEDICINE

## 2021-04-23 PROCEDURE — 99999 PR PBB SHADOW E&M-EST. PATIENT-LVL III: ICD-10-PCS | Mod: PBBFAC,,, | Performed by: INTERNAL MEDICINE

## 2021-04-23 PROCEDURE — 3008F PR BODY MASS INDEX (BMI) DOCUMENTED: ICD-10-PCS | Mod: CPTII,S$GLB,, | Performed by: INTERNAL MEDICINE

## 2021-04-23 PROCEDURE — 1126F PR PAIN SEVERITY QUANTIFIED, NO PAIN PRESENT: ICD-10-PCS | Mod: S$GLB,,, | Performed by: INTERNAL MEDICINE

## 2021-04-23 PROCEDURE — 1126F AMNT PAIN NOTED NONE PRSNT: CPT | Mod: S$GLB,,, | Performed by: INTERNAL MEDICINE

## 2021-04-23 PROCEDURE — 99396 PR PREVENTIVE VISIT,EST,40-64: ICD-10-PCS | Mod: S$GLB,,, | Performed by: INTERNAL MEDICINE

## 2021-04-23 PROCEDURE — 99396 PREV VISIT EST AGE 40-64: CPT | Mod: S$GLB,,, | Performed by: INTERNAL MEDICINE

## 2021-04-28 LAB
ALBUMIN SERPL-MCNC: 4.4 G/DL (ref 3.6–5.1)
ALBUMIN/GLOB SERPL: 1.9 (CALC) (ref 1–2.5)
ALP SERPL-CCNC: 75 U/L (ref 37–153)
ALT SERPL-CCNC: 11 U/L (ref 6–29)
AST SERPL-CCNC: 13 U/L (ref 10–35)
BASOPHILS # BLD AUTO: 29 CELLS/UL (ref 0–200)
BASOPHILS NFR BLD AUTO: 0.7 %
BILIRUB SERPL-MCNC: 0.5 MG/DL (ref 0.2–1.2)
BUN SERPL-MCNC: 12 MG/DL (ref 7–25)
BUN/CREAT SERPL: NORMAL (CALC) (ref 6–22)
CALCIUM SERPL-MCNC: 9.9 MG/DL (ref 8.6–10.4)
CHLORIDE SERPL-SCNC: 106 MMOL/L (ref 98–110)
CHOLEST SERPL-MCNC: 173 MG/DL
CHOLEST/HDLC SERPL: 2.8 (CALC)
CO2 SERPL-SCNC: 28 MMOL/L (ref 20–32)
CREAT SERPL-MCNC: 0.64 MG/DL (ref 0.5–1.05)
EOSINOPHIL # BLD AUTO: 160 CELLS/UL (ref 15–500)
EOSINOPHIL NFR BLD AUTO: 3.9 %
ERYTHROCYTE [DISTWIDTH] IN BLOOD BY AUTOMATED COUNT: 11.9 % (ref 11–15)
GLOBULIN SER CALC-MCNC: 2.3 G/DL (CALC) (ref 1.9–3.7)
GLUCOSE SERPL-MCNC: 86 MG/DL (ref 65–99)
HCT VFR BLD AUTO: 41.4 % (ref 35–45)
HCV AB S/CO SERPL IA: 0
HCV AB SERPL QL IA: NORMAL
HDLC SERPL-MCNC: 62 MG/DL
HGB BLD-MCNC: 13.8 G/DL (ref 11.7–15.5)
HIV 1+2 AB+HIV1 P24 AG SERPL QL IA: NORMAL
LDLC SERPL CALC-MCNC: 90 MG/DL (CALC)
LYMPHOCYTES # BLD AUTO: 2308 CELLS/UL (ref 850–3900)
LYMPHOCYTES NFR BLD AUTO: 56.3 %
MCH RBC QN AUTO: 31.9 PG (ref 27–33)
MCHC RBC AUTO-ENTMCNC: 33.3 G/DL (ref 32–36)
MCV RBC AUTO: 95.8 FL (ref 80–100)
MONOCYTES # BLD AUTO: 324 CELLS/UL (ref 200–950)
MONOCYTES NFR BLD AUTO: 7.9 %
NEUTROPHILS # BLD AUTO: 1279 CELLS/UL (ref 1500–7800)
NEUTROPHILS NFR BLD AUTO: 31.2 %
NONHDLC SERPL-MCNC: 111 MG/DL (CALC)
PLATELET # BLD AUTO: 313 THOUSAND/UL (ref 140–400)
PMV BLD REES-ECKER: 9.9 FL (ref 7.5–12.5)
POTASSIUM SERPL-SCNC: 4.2 MMOL/L (ref 3.5–5.3)
PROT SERPL-MCNC: 6.7 G/DL (ref 6.1–8.1)
RBC # BLD AUTO: 4.32 MILLION/UL (ref 3.8–5.1)
SODIUM SERPL-SCNC: 143 MMOL/L (ref 135–146)
T4 FREE SERPL-MCNC: 1 NG/DL (ref 0.8–1.8)
TRIGL SERPL-MCNC: 116 MG/DL
TSH SERPL-ACNC: 1.73 MIU/L
WBC # BLD AUTO: 4.1 THOUSAND/UL (ref 3.8–10.8)

## 2021-04-30 ENCOUNTER — OFFICE VISIT (OUTPATIENT)
Dept: ENDOCRINOLOGY | Facility: CLINIC | Age: 56
End: 2021-04-30
Payer: COMMERCIAL

## 2021-04-30 VITALS
WEIGHT: 130.5 LBS | OXYGEN SATURATION: 99 % | BODY MASS INDEX: 22.76 KG/M2 | RESPIRATION RATE: 16 BRPM | DIASTOLIC BLOOD PRESSURE: 70 MMHG | SYSTOLIC BLOOD PRESSURE: 116 MMHG | HEART RATE: 65 BPM

## 2021-04-30 DIAGNOSIS — M81.0 AGE-RELATED OSTEOPOROSIS WITHOUT CURRENT PATHOLOGICAL FRACTURE: ICD-10-CM

## 2021-04-30 DIAGNOSIS — M81.0 OSTEOPOROSIS, UNSPECIFIED OSTEOPOROSIS TYPE, UNSPECIFIED PATHOLOGICAL FRACTURE PRESENCE: ICD-10-CM

## 2021-04-30 DIAGNOSIS — K21.9 GERD WITHOUT ESOPHAGITIS: ICD-10-CM

## 2021-04-30 PROCEDURE — 99204 OFFICE O/P NEW MOD 45 MIN: CPT | Mod: S$GLB,,, | Performed by: INTERNAL MEDICINE

## 2021-04-30 PROCEDURE — 99204 PR OFFICE/OUTPT VISIT, NEW, LEVL IV, 45-59 MIN: ICD-10-PCS | Mod: S$GLB,,, | Performed by: INTERNAL MEDICINE

## 2021-04-30 PROCEDURE — 1126F AMNT PAIN NOTED NONE PRSNT: CPT | Mod: S$GLB,,, | Performed by: INTERNAL MEDICINE

## 2021-04-30 PROCEDURE — 3008F BODY MASS INDEX DOCD: CPT | Mod: CPTII,S$GLB,, | Performed by: INTERNAL MEDICINE

## 2021-04-30 PROCEDURE — 99999 PR PBB SHADOW E&M-EST. PATIENT-LVL IV: CPT | Mod: PBBFAC,,, | Performed by: INTERNAL MEDICINE

## 2021-04-30 PROCEDURE — 3008F PR BODY MASS INDEX (BMI) DOCUMENTED: ICD-10-PCS | Mod: CPTII,S$GLB,, | Performed by: INTERNAL MEDICINE

## 2021-04-30 PROCEDURE — 1126F PR PAIN SEVERITY QUANTIFIED, NO PAIN PRESENT: ICD-10-PCS | Mod: S$GLB,,, | Performed by: INTERNAL MEDICINE

## 2021-04-30 PROCEDURE — 99999 PR PBB SHADOW E&M-EST. PATIENT-LVL IV: ICD-10-PCS | Mod: PBBFAC,,, | Performed by: INTERNAL MEDICINE

## 2021-04-30 RX ORDER — ALENDRONATE SODIUM 70 MG/1
TABLET ORAL
Qty: 12 TABLET | Refills: 3 | Status: SHIPPED | OUTPATIENT
Start: 2021-04-30 | End: 2022-04-18

## 2021-05-03 LAB — PTH-INTACT SERPL-MCNC: 44 PG/ML (ref 14–64)

## 2021-05-04 ENCOUNTER — PATIENT MESSAGE (OUTPATIENT)
Dept: FAMILY MEDICINE | Facility: CLINIC | Age: 56
End: 2021-05-04

## 2021-05-04 LAB
25(OH)D3 SERPL-MCNC: 54 NG/ML (ref 30–100)
ALBUMIN SERPL ELPH-MCNC: 4.7 G/DL (ref 3.8–4.8)
ALBUMIN: 4.9 GRAM/DL (ref 3.5–5)
ALPHA1 GLOB SERPL ELPH-MCNC: 0.2 G/DL (ref 0.2–0.3)
ALPHA2 GLOB SERPL ELPH-MCNC: 0.5 G/DL (ref 0.5–0.9)
BETA1 GLOB SERPL ELPH-MCNC: 0.4 G/DL (ref 0.4–0.6)
BETA2 GLOB SERPL ELPH-MCNC: 0.3 G/DL (ref 0.2–0.5)
BUN BLD-MCNC: 14 MG/DL (ref 7–21)
CALCIUM SERPL-MCNC: 9.8 MG/DL (ref 8.5–10.4)
CHLORIDE SERPL-SCNC: 99 MEQ/L (ref 98–107)
CO2 SERPL-SCNC: 29 MEQ/L (ref 21–31)
CREAT SERPL-MCNC: 0.6 MG/DL (ref 0.5–1)
GAMMA GLOB SERPL ELPH-MCNC: 0.9 G/DL (ref 0.8–1.7)
GFR: 108.8 ML/MIN/1.73M2
GLUCOSE SERPL-MCNC: 93 MG/DL (ref 70–100)
PHOSPHATE FLD-MCNC: 4.2 MG/DL (ref 2.4–4.4)
POTASSIUM SERPL-SCNC: 4.5 MEQ/L (ref 3.5–5)
PROT PATTERN SERPL ELPH-IMP: NORMAL
PROT SERPL-MCNC: 7.2 G/DL (ref 6.1–8.1)
SODIUM BLD-SCNC: 139 MEQ/L (ref 135–145)

## 2022-02-03 ENCOUNTER — PATIENT MESSAGE (OUTPATIENT)
Dept: INTERNAL MEDICINE | Facility: CLINIC | Age: 57
End: 2022-02-03
Payer: COMMERCIAL

## 2022-02-03 DIAGNOSIS — R00.2 PALPITATIONS: Primary | ICD-10-CM

## 2022-02-03 NOTE — LETTER
AUTHORIZATION FOR RELEASE OF   CONFIDENTIAL INFORMATION    Dear Dr. Smith,    We are seeing Melina Jean, date of birth 1965, in the clinic at [facility]. Eliud Gerard MD is the patient's PCP. Melina Jean has an outstanding lab/procedure at the time we reviewed her chart. In order to help keep her health information updated, she has authorized us to request the following medical record(s):        (  )  MAMMOGRAM                                      (  )  COLONOSCOPY      (  )  PAP SMEAR                                          ( X )  OUTSIDE LAB RESULTS     (  )  DEXA SCAN                                          (  )  EYE EXAM            (  )  FOOT EXAM                                          (X  )  ENTIRE RECORD     (  )  OUTSIDE IMMUNIZATIONS                 (  )  _______________         Please fax records to [facility], David M Klibert, MD,[phone]     If you have any questions, please contact Tabatha at [phone].           Patient Name: Melina Jean  : 1965  Patient Phone #: [phone]

## 2022-02-17 ENCOUNTER — PATIENT MESSAGE (OUTPATIENT)
Dept: INTERNAL MEDICINE | Facility: CLINIC | Age: 57
End: 2022-02-17
Payer: COMMERCIAL

## 2022-02-23 ENCOUNTER — PATIENT MESSAGE (OUTPATIENT)
Dept: INTERNAL MEDICINE | Facility: CLINIC | Age: 57
End: 2022-02-23
Payer: COMMERCIAL

## 2022-02-25 ENCOUNTER — OFFICE VISIT (OUTPATIENT)
Dept: CARDIOLOGY | Facility: CLINIC | Age: 57
End: 2022-02-25
Payer: COMMERCIAL

## 2022-02-25 VITALS
WEIGHT: 135.38 LBS | BODY MASS INDEX: 23.99 KG/M2 | HEIGHT: 63 IN | HEART RATE: 63 BPM | DIASTOLIC BLOOD PRESSURE: 79 MMHG | SYSTOLIC BLOOD PRESSURE: 121 MMHG

## 2022-02-25 DIAGNOSIS — R00.2 PALPITATIONS: ICD-10-CM

## 2022-02-25 DIAGNOSIS — Z03.89 CORONARY ARTERY DISEASE EXCLUDED: ICD-10-CM

## 2022-02-25 PROBLEM — D50.9 IDA (IRON DEFICIENCY ANEMIA): Status: ACTIVE | Noted: 2019-01-30

## 2022-02-25 PROCEDURE — 3078F PR MOST RECENT DIASTOLIC BLOOD PRESSURE < 80 MM HG: ICD-10-PCS | Mod: CPTII,S$GLB,, | Performed by: INTERNAL MEDICINE

## 2022-02-25 PROCEDURE — 1160F RVW MEDS BY RX/DR IN RCRD: CPT | Mod: CPTII,S$GLB,, | Performed by: INTERNAL MEDICINE

## 2022-02-25 PROCEDURE — 3008F PR BODY MASS INDEX (BMI) DOCUMENTED: ICD-10-PCS | Mod: CPTII,S$GLB,, | Performed by: INTERNAL MEDICINE

## 2022-02-25 PROCEDURE — 3008F BODY MASS INDEX DOCD: CPT | Mod: CPTII,S$GLB,, | Performed by: INTERNAL MEDICINE

## 2022-02-25 PROCEDURE — 3074F SYST BP LT 130 MM HG: CPT | Mod: CPTII,S$GLB,, | Performed by: INTERNAL MEDICINE

## 2022-02-25 PROCEDURE — 1159F MED LIST DOCD IN RCRD: CPT | Mod: CPTII,S$GLB,, | Performed by: INTERNAL MEDICINE

## 2022-02-25 PROCEDURE — 99999 PR PBB SHADOW E&M-EST. PATIENT-LVL IV: CPT | Mod: PBBFAC,,, | Performed by: INTERNAL MEDICINE

## 2022-02-25 PROCEDURE — 99999 PR PBB SHADOW E&M-EST. PATIENT-LVL IV: ICD-10-PCS | Mod: PBBFAC,,, | Performed by: INTERNAL MEDICINE

## 2022-02-25 PROCEDURE — 3078F DIAST BP <80 MM HG: CPT | Mod: CPTII,S$GLB,, | Performed by: INTERNAL MEDICINE

## 2022-02-25 PROCEDURE — 1159F PR MEDICATION LIST DOCUMENTED IN MEDICAL RECORD: ICD-10-PCS | Mod: CPTII,S$GLB,, | Performed by: INTERNAL MEDICINE

## 2022-02-25 PROCEDURE — 1160F PR REVIEW ALL MEDS BY PRESCRIBER/CLIN PHARMACIST DOCUMENTED: ICD-10-PCS | Mod: CPTII,S$GLB,, | Performed by: INTERNAL MEDICINE

## 2022-02-25 PROCEDURE — 99204 PR OFFICE/OUTPT VISIT, NEW, LEVL IV, 45-59 MIN: ICD-10-PCS | Mod: S$GLB,,, | Performed by: INTERNAL MEDICINE

## 2022-02-25 PROCEDURE — 99204 OFFICE O/P NEW MOD 45 MIN: CPT | Mod: S$GLB,,, | Performed by: INTERNAL MEDICINE

## 2022-02-25 PROCEDURE — 3074F PR MOST RECENT SYSTOLIC BLOOD PRESSURE < 130 MM HG: ICD-10-PCS | Mod: CPTII,S$GLB,, | Performed by: INTERNAL MEDICINE

## 2022-02-25 RX ORDER — ESZOPICLONE 3 MG/1
3 TABLET, FILM COATED ORAL NIGHTLY PRN
COMMUNITY
Start: 2022-01-04 | End: 2023-04-05

## 2022-02-25 RX ORDER — METOPROLOL TARTRATE 25 MG/1
25 TABLET, FILM COATED ORAL DAILY
COMMUNITY
Start: 2022-01-13 | End: 2022-08-03

## 2022-02-25 RX ORDER — FAMOTIDINE 20 MG/1
20 TABLET, FILM COATED ORAL DAILY
COMMUNITY

## 2022-02-25 NOTE — PATIENT INSTRUCTIONS
"Please take magnesium oxide 400 mg twice a day for 1 month, then once daily.  This is very good for palpitations.  The only complication could be diarrhea, if that were to occur, decrease the dose.    After 2 weeks, decrease metoprolol to 1/2 tablet daily, if palpitations are no worse, you could then decreased to every other day and discontinue after a week.    I recommend the book, "The Obesity Code" for weight loss; it recommends intermittent fasting and avoidance of sugar, artificial sweeteners and refined carbohydrates.    Also, here is information on a Mediterranean type diet including fish, the pesco-mediterranean diet from the American College of Cardiology:    1.  Humans are evolutionarily adapted to obtain calories and nutrients from both plant and animal food sources. Many people overconsume animal products, often-processed meats high in saturated fats and chemical additives. In contrast, while strict veganism has gained popularity for many reasons and has value in certain groups, it can cause nutritional deficiencies (vitamin B12, high-quality proteins, iron, zinc, omega-3 fatty acid, vitamin D, and calcium), and predispose to osteopenia, loss of muscle mass, and anemia. This is not true of a lacto-ovo vegetarian diet, which allows no animal-based food except for eggs and dairy. A 6-year study of [id number] [place] [employer] reported a decreased incidence of all-cause mortality when comparing vegetarians with nonvegetarians. However, when the vegetarians were stratified into vegans, lacto-ovo vegetarians, pesco-vegetarians, and semi-vegetarians, the pesco-vegetarians had lowest risks for all-cause mortality, cardiovascular disease (CVD) mortality, and mortality from other causes.     2.  The authors propose a plant-rich diet rich in nuts with fish and seafood as the principle source of animal food. Known as the Pesco-Mediterranean diet, it is supplemented with extra-virgin olive oil (EVOO), which is " the principle fat source, along with moderate amounts of dairy (particularly yogurt and cheese) and eggs, as well as modest amounts of alcohol consumption (ideally red wine with the evening meal), but few red and processed meats.     3.  Both epidemiological studies and randomized clinical trials indicate that the traditional Mediterranean diet is associated with lower risks for all-cause and CVD mortality, coronary heart disease, metabolic syndrome, diabetes, cognitive decline, neurodegenerative diseases (including Alzheimers), depression, overall cancer mortality, and breast and colorectal cancers.     4.  The traditional Mediterranean diet has been endorsed in the most recent Dietary Guidelines for Americans and the American College of Cardiology/American Heart Association guidelines. The 2020 [employer] ranked it #1 for overall health based upon it being nutritious, safe, relatively easy to follow, protective against CVD and diabetes, and effective for weight loss.     5.  Fish and seafood are important sources of vitamins protein and omega-3 fatty acids, of which the higher blood and adipose tissue are associated with reduced fatal and nonfatal myocardial infarction. When not fried, fish consumption has been associated with reduced risk of heart failure, and the incidence of the metabolic syndrome, coronary heart disease, ischemic stroke, and sudden cardiac death, particularly when seafood replaces less healthy foods.     6.  Unrestricted use of olive oil in the kitchen, on salads (with vinegar), cooking vegetables, and at the table is the foundation of the traditional Mediterranean diet, although olive oil quality is crucial, which makes it expensive. EVOO retains hydrophilic components of olives including highly bioactive polyphenols, which are believed to underlie many of EVOOs cardiometabolic benefits, such as reduced low-density lipoprotein cholesterol (LDL-C) and increased high-density  lipoprotein cholesterol (HDL-C), improved vascular reactivity, enhanced HDL particle functionality, and a lower diabetes risk.     7.  Tree nuts, an integral component of the traditional Mediterranean diet, are nutrient dense rich in unsaturated fats, fiber, protein, polyphenols, phytosterols, and tocopherols. Nut consumption is associated with decreased incidence and mortality rates from both CVD and coronary artery disease (CAD), as well as atrial fibrillation and diabetes. Randomized controlled trials have shown that diets enriched with nuts produce cardiometabolic benefits including improvements in insulin sensitivity, LDL-C, inflammation, and vascular reactivity. A 1-daily serving of mixed nuts resulted in a 28% reduction in CVD risk. Generous intake of nuts does not promote weight gain because of increased satiety and incomplete digestion.     8.  Legumes are an excellent source of vegetable protein, folate, and magnesium and fiber, and like other seeds including peanuts, are rich in polyphenols. Consumption of legumes has been linked to a reduced risk of incident and fatal CVD and CAD, as well as improvements in blood glucose, cholesterol, blood pressure, and body weight. Legumes, like fish, are a satiating and healthy substitute for red meat and processed meats.     9.  Dairy products and eggs are important sources of protein, nonsodium minerals, probiotics, and vitamin D. Although there is no clear consensus among nutrition experts on the role of dairy products in CVD risk, they are allowed in this Pesco-Mediterranean diet. Fermented low-fat versions, such as yogurt and soft cheeses, are preferred; butter and hard cheese are high in saturated fats and salt.     10.  Eggs are composed of beneficial nutrients including all essential amino acids, in addition to minerals (selenium, phosphorus, iodine, zinc), vitamins (A, D, B2, B12, niacin), and carotenoids (lutein, zeaxanthin). Although each yolk contains  about 184 mg of dietary cholesterol, large prospective cohorts suggest that egg consumption is unrelated to serum cholesterol and does not increase CVD risk. Eggs are allowed in the Pesco-Mediterranean diet; egg whites are unlimited and preferably no more than 5 yolks/week.     11.  Whole grains, such as barley, whole oats, rye, corn, buckwheat, brown rice, and quinoa, are an integral part of the traditional Mediterranean diet. Pasta is an example of a starchy food that has a low glycemic index despite being a refined carbohydrate. In the context of a low glycemic index dietary pattern such as the Mediterranean diet, pasta does not adversely affect adiposity and may even help reduce body weight and there is no evidence that pasta promotes cardiometabolic risk factors. White rice is associated with type 2 diabetes mellitus in Asians but not in Western cohorts, possibly because it is cooked and served plain in [place] and in Western cultures cooked in mixed dishes with vegetables and vegetable oil including EVOO.     12.  The staple beverage of the Pesco-Mediterranean diet is water--which can be flavored but not sweetened. Unsweetened tea and coffee are rich in antioxidants and are associated with improved CVD outcomes. If alcohol is consumed at all, dry red wine is recommended, with the ideal amount being a single glass (6 oz) for women and 1 or 2 glasses/day for men (6-12 oz) consumed with meals.     13.  Time-restricted eating, a type of intermittent fasting, is the practice of limiting the daily intake of calories to a window of time usually between 6-12 hours each day. Intermittent fasting when done on a regular basis has been shown to decrease intra-abdominal adipose tissue and reduce free-radical production. This elicits powerful cellular responses that improve glucose metabolism and reduce systemic inflammation, and may also reduce risks of diabetes, CVD, cancer, and neurodegenerative diseases. After a 12-hour  overnight fast, insulin levels are typically low, and glycogen stores have been depleted. In this fasted state, the body starts mobilizing fatty acids from adipose cells to burn as metabolic fuel instead of glucose. This improves insulin sensitivity. Time-restricted eating is not more effective for weight loss than standard calorie-restriction, but appears to enhance CV health even in nonobese people. Fasting may also lower blood pressure and resting heart rate and improve autonomic balance with augmented heart rate variability.     14.  The evidence regarding time-restricted eating is mostly based on animal models and observational human studies. The most popular form of time-restricted eating involves eating two rather than three meals and compressing the calorie-consumption window. No head-to-head studies have been performed to assess the optimal time window.

## 2022-02-25 NOTE — PROGRESS NOTES
Subjective:   02/25/2022     Patient ID:  Melina Jean is a 56 y.o. female who presents for evaulation of Abnormal ECG      She comes in for a 2nd opinion.  She was seen by another cardiologist on the [facility] with palpitations and history of mitral valve prolapse.  EKG did show sinus rhythm with PACs.  She was begun on metoprolol tartrate 25 mg daily, she underwent a stress test, there were no ST changes, it was noted that there was some anterior reversibility blood breast and movement attenuation in .  Her LDL was 112, HDL 71. A coronary calcium score was obtained, this was 0.    She has no complaints of chest pains or tightness.  She does have occasional palpitations.  She had had chest discomfort, but had blamed on stress after the death of her father.    She has does not have hypertension, diabetes.  She has never smoked cigarettes.          Past Medical History:   Diagnosis Date    GERD (gastroesophageal reflux disease)     Osteoporosis        Review of patient's allergies indicates:  No Known Allergies      Current Outpatient Medications:     alendronate (FOSAMAX) 70 MG tablet, Take once a week on empty stomach, do not recline for 60 min after, Disp: 12 tablet, Rfl: 3    ascorbic acid, vitamin C, 500 mg Chew, Take by mouth Daily., Disp: , Rfl:     biotin 10,000 mcg Cap, Take 1 capsule by mouth once daily., Disp: , Rfl:     cyanocobalamin-cobamamide (B12) 5,000-100 mcg Lozg, Take by mouth Daily., Disp: , Rfl:     eszopiclone (LUNESTA) 3 mg Tab, Take 3 mg by mouth nightly as needed., Disp: , Rfl:     famotidine (PEPCID) 20 MG tablet, Take 20 mg by mouth Daily., Disp: , Rfl:     fluticasone propionate (FLONASE) 50 mcg/actuation nasal spray, , Disp: , Rfl:     metoprolol tartrate (LOPRESSOR) 25 MG tablet, Take 25 mg by mouth once daily., Disp: , Rfl:      Objective:   Review of Systems   Cardiovascular: Positive for irregular heartbeat and palpitations. Negative for chest pain, claudication,  cyanosis, dyspnea on exertion, leg swelling, near-syncope, orthopnea, paroxysmal nocturnal dyspnea and syncope.       Vitals:    02/25/22 0859   BP: 121/79   Pulse: 63       Physical Exam  Vitals reviewed.   Constitutional:       General: She is not in acute distress.     Appearance: She is well-developed.   HENT:      Head: Normocephalic and atraumatic.      Nose: Nose normal.   Eyes:      Conjunctiva/sclera: Conjunctivae normal.      Pupils: Pupils are equal, round, and reactive to light.   Neck:      Vascular: No JVD.   Cardiovascular:      Rate and Rhythm: Normal rate and regular rhythm.      Pulses: Intact distal pulses.      Heart sounds: Normal heart sounds. No murmur heard.    No friction rub. No gallop.   Pulmonary:      Effort: Pulmonary effort is normal. No respiratory distress.      Breath sounds: Normal breath sounds. No wheezing or rales.   Chest:      Chest wall: No tenderness.   Abdominal:      General: Bowel sounds are normal. There is no distension.      Palpations: Abdomen is soft.      Tenderness: There is no abdominal tenderness.   Musculoskeletal:         General: No tenderness or deformity. Normal range of motion.      Cervical back: Normal range of motion and neck supple.   Skin:     General: Skin is warm and dry.      Findings: No erythema or rash.   Neurological:      Mental Status: She is alert and oriented to person, place, and time.      Cranial Nerves: No cranial nerve deficit.      Motor: No abnormal muscle tone.      Coordination: Coordination normal.   Psychiatric:         Behavior: Behavior normal.         Thought Content: Thought content normal.         Judgment: Judgment normal.           Lab Results   Component Value Date    CHOL 173 04/27/2021    CHOL 170 11/23/2019     Lab Results   Component Value Date    HDL 62 04/27/2021    HDL 75 11/23/2019     Lab Results   Component Value Date    LDLCALC 90 04/27/2021    LDLCALC 81 11/23/2019     Lab Results   Component Value Date    ALT  11 04/27/2021    AST 13 04/27/2021     Lab Results   Component Value Date    CREATININE 0.6 05/01/2021    BUN 14 05/01/2021     05/01/2021    K 4.5 05/01/2021    CO2 29 05/01/2021    CO2 28 04/27/2021     Lab Results   Component Value Date    HGB 13.8 04/27/2021    HCT 41.4 04/27/2021                         Assessment and Plan:     Palpitations  Comments:  Will try magnesium and wean metoprolol, secondary to PACs  Orders:  -     Ambulatory referral/consult to Cardiology    Coronary artery disease excluded  Comments:  Coronary calcium score is 0, coronary artery disease excluded to the degree that that allow also.       Abnormal echocardiography is noted.  She will obtain her echo disc for me to review.    Second opinion rendered, she appears to be a very low risk for cardiac events.  Echocardiography will be reviewed to assess leaking valves and mitral valve prolapse.    Palpitations we treated with supplemental magnesium and beta-blocker therapy will be will weaned if possible.  If she feels better on beta-blockers, no reason to discontinue.      Follow up in about 6 months (around 8/25/2022).

## 2022-03-02 ENCOUNTER — PATIENT OUTREACH (OUTPATIENT)
Dept: ADMINISTRATIVE | Facility: HOSPITAL | Age: 57
End: 2022-03-02
Payer: COMMERCIAL

## 2022-03-02 NOTE — PROGRESS NOTES
Health Maintenance Due   Topic Date Due    COVID-19 Vaccine (2 - Booster for Cristy series) 05/27/2021    Mammogram  03/16/2022     Triggered LINKS & reconciled immunizations.  Updated Care Everywhere.  Scanned & hyperlinked outside lab results into chart.  Chart review completed.

## 2022-03-17 ENCOUNTER — OFFICE VISIT (OUTPATIENT)
Dept: OBSTETRICS AND GYNECOLOGY | Facility: CLINIC | Age: 57
End: 2022-03-17
Attending: STUDENT IN AN ORGANIZED HEALTH CARE EDUCATION/TRAINING PROGRAM
Payer: COMMERCIAL

## 2022-03-17 VITALS
SYSTOLIC BLOOD PRESSURE: 116 MMHG | BODY MASS INDEX: 22.95 KG/M2 | HEIGHT: 63 IN | DIASTOLIC BLOOD PRESSURE: 66 MMHG | WEIGHT: 129.5 LBS

## 2022-03-17 DIAGNOSIS — Z12.39 ENCOUNTER FOR SCREENING FOR MALIGNANT NEOPLASM OF BREAST, UNSPECIFIED SCREENING MODALITY: ICD-10-CM

## 2022-03-17 DIAGNOSIS — Z01.419 WELL WOMAN EXAM: Primary | ICD-10-CM

## 2022-03-17 DIAGNOSIS — M85.80 OSTEOPENIA, UNSPECIFIED LOCATION: ICD-10-CM

## 2022-03-17 PROCEDURE — 3078F DIAST BP <80 MM HG: CPT | Mod: CPTII,S$GLB,, | Performed by: STUDENT IN AN ORGANIZED HEALTH CARE EDUCATION/TRAINING PROGRAM

## 2022-03-17 PROCEDURE — 3008F PR BODY MASS INDEX (BMI) DOCUMENTED: ICD-10-PCS | Mod: CPTII,S$GLB,, | Performed by: STUDENT IN AN ORGANIZED HEALTH CARE EDUCATION/TRAINING PROGRAM

## 2022-03-17 PROCEDURE — 1159F MED LIST DOCD IN RCRD: CPT | Mod: CPTII,S$GLB,, | Performed by: STUDENT IN AN ORGANIZED HEALTH CARE EDUCATION/TRAINING PROGRAM

## 2022-03-17 PROCEDURE — 3074F SYST BP LT 130 MM HG: CPT | Mod: CPTII,S$GLB,, | Performed by: STUDENT IN AN ORGANIZED HEALTH CARE EDUCATION/TRAINING PROGRAM

## 2022-03-17 PROCEDURE — 99999 PR PBB SHADOW E&M-EST. PATIENT-LVL III: ICD-10-PCS | Mod: PBBFAC,,, | Performed by: STUDENT IN AN ORGANIZED HEALTH CARE EDUCATION/TRAINING PROGRAM

## 2022-03-17 PROCEDURE — 99999 PR PBB SHADOW E&M-EST. PATIENT-LVL III: CPT | Mod: PBBFAC,,, | Performed by: STUDENT IN AN ORGANIZED HEALTH CARE EDUCATION/TRAINING PROGRAM

## 2022-03-17 PROCEDURE — 3078F PR MOST RECENT DIASTOLIC BLOOD PRESSURE < 80 MM HG: ICD-10-PCS | Mod: CPTII,S$GLB,, | Performed by: STUDENT IN AN ORGANIZED HEALTH CARE EDUCATION/TRAINING PROGRAM

## 2022-03-17 PROCEDURE — 3008F BODY MASS INDEX DOCD: CPT | Mod: CPTII,S$GLB,, | Performed by: STUDENT IN AN ORGANIZED HEALTH CARE EDUCATION/TRAINING PROGRAM

## 2022-03-17 PROCEDURE — 1159F PR MEDICATION LIST DOCUMENTED IN MEDICAL RECORD: ICD-10-PCS | Mod: CPTII,S$GLB,, | Performed by: STUDENT IN AN ORGANIZED HEALTH CARE EDUCATION/TRAINING PROGRAM

## 2022-03-17 PROCEDURE — 3074F PR MOST RECENT SYSTOLIC BLOOD PRESSURE < 130 MM HG: ICD-10-PCS | Mod: CPTII,S$GLB,, | Performed by: STUDENT IN AN ORGANIZED HEALTH CARE EDUCATION/TRAINING PROGRAM

## 2022-03-17 PROCEDURE — 99396 PREV VISIT EST AGE 40-64: CPT | Mod: S$GLB,,, | Performed by: STUDENT IN AN ORGANIZED HEALTH CARE EDUCATION/TRAINING PROGRAM

## 2022-03-17 PROCEDURE — 99396 PR PREVENTIVE VISIT,EST,40-64: ICD-10-PCS | Mod: S$GLB,,, | Performed by: STUDENT IN AN ORGANIZED HEALTH CARE EDUCATION/TRAINING PROGRAM

## 2022-03-17 NOTE — PROGRESS NOTES
Chief Complaint: Well Woman Exam     HPI:      Melina Jean is a 56 y.o.  who presents today for well woman exam.  LMP: No LMP recorded. Patient is perimenopausal.  Does report some vaginal dryness.  No other issues, problems, or complaints. Specifically, patient denies vaginal bleeding, discharge, pelvic pain, urinary problems, or changes in appetite.   She denies hot flushes, vaginal atrophy, mood changes.  Ms. Jean is currently sexually active with a single male partner.      Works as .    Previous Pap:  2021 NEM, HPV neg  Previous Mammogram:  Normal per patient  Most Recent Dexa: Abnormal per patient 2 years ago  Colonoscopy:  Normal per patient    OB History        2    Para   2    Term   2            AB        Living   2       SAB        IAB        Ectopic        Multiple        Live Births   2           Obstetric Comments   Menarche age 15           Past Medical History:   Diagnosis Date    GERD (gastroesophageal reflux disease)     Osteoporosis      Past Surgical History:   Procedure Laterality Date    CHOLECYSTECTOMY      TONSILLECTOMY       Social History     Socioeconomic History    Marital status:    Tobacco Use    Smoking status: Never Smoker    Smokeless tobacco: Never Used   Substance and Sexual Activity    Alcohol use: Yes     Alcohol/week: 5.0 standard drinks     Types: 1 Glasses of wine, 3 Cans of beer, 1 Standard drinks or equivalent per week     Comment: Socially not every week    Drug use: Never    Sexual activity: Yes     Partners: Male     Birth control/protection: None     Family History   Problem Relation Age of Onset    Colon cancer Mother     Cancer Mother         Colon    COPD Mother         Smoker    No Known Problems Father     No Known Problems Brother        Current Outpatient Medications:     alendronate (FOSAMAX) 70 MG tablet, Take once a week on empty stomach, do not recline for 60 min after, Disp: 12  "tablet, Rfl: 3    ascorbic acid, vitamin C, 500 mg Chew, Take by mouth Daily., Disp: , Rfl:     biotin 10,000 mcg Cap, Take 1 capsule by mouth once daily., Disp: , Rfl:     cyanocobalamin-cobamamide (B12) 5,000-100 mcg Lozg, Take by mouth Daily., Disp: , Rfl:     eszopiclone (LUNESTA) 3 mg Tab, Take 3 mg by mouth nightly as needed., Disp: , Rfl:     famotidine (PEPCID) 20 MG tablet, Take 20 mg by mouth Daily., Disp: , Rfl:     metoprolol tartrate (LOPRESSOR) 25 MG tablet, Take 25 mg by mouth once daily., Disp: , Rfl:     fluticasone propionate (FLONASE) 50 mcg/actuation nasal spray, , Disp: , Rfl:     ROS:     GENERAL: Denies unintentional weight gain or weight loss. Feeling well overall.   SKIN: Denies rash or lesions.   HEENT: Denies headaches, or vision changes.   CARDIOVASCULAR: Denies palpitations or chest pain.   RESPIRATORY: Denies shortness of breath or dyspnea on exertion.  BREASTS: Denies pain, lumps, or nipple discharge.   ABDOMEN: Denies abdominal pain, constipation, diarrhea, nausea, vomiting, change in appetite.  URINARY: Denies frequency, dysuria, hematuria.  NEUROLOGIC: Denies syncope or weakness.   PSYCHIATRIC: Denies depression, anxiety or mood swings.    Physical Exam:      PHYSICAL EXAM:  /66   Ht 5' 3" (1.6 m)   Wt 58.7 kg (129 lb 8.3 oz)   BMI 22.94 kg/m²   Body mass index is 22.94 kg/m².     APPEARANCE: Well nourished, well developed, in no acute distress.  PSYCH: Appropriate mood and affect.  SKIN: No acne or hirsutism.  NECK: Neck symmetric without masses or thyromegaly.  NODES: No inguinal, axillary, or supraclavicular lymph node enlargement.  CHEST: Normal respiratory effort.    CARDIOVASCULAR:  Regular rate and rhythm.  LUNGS:  Clear to auscultation bilaterally.  BREASTS: Symmetrical, no skin changes or visible lesions.  No palpable masses or nipple discharge bilaterally.  ABDOMEN: Soft.  No tenderness or masses.    PELVIC: Normal external genitalia without lesions.  " Normal hair distribution.  Adequate perineal body, normal urethral meatus.  Vagina moist and well rugated without lesions or discharge.  Cervix pink, without lesions, discharge or tenderness.  No significant cystocele or rectocele.  Bimanual exam shows uterus to be normal size, regular, mobile and nontender.  Adnexa without masses or tenderness.    EXTREMITIES: No edema.  No tenderness to palpation.      Assessment/Plan:     56 y.o.     Well woman exam    Osteopenia, unspecified location  -     DXA Bone Density Spine And Hip; Future; Expected date: 2022    Encounter for screening for malignant neoplasm of breast, unspecified screening modality  -     Mammo Digital Screening Bilat w/ Earle; Future; Expected date: 2022          Counselin.  Annual exam performed today without difficulty.  Patient was counseled today on current ASCCP pap guidelines, the recommendation for yearly pelvic exams, healthy diet and exercise routines, annual mammograms.  Mammogram ordered.  Pap smear collected.  All questions answered.  Bone dnesity.  2.  Follow up with PCP for other health maintenance.  3.  Follow up in about 1 year (around 3/17/2023).     Use of the [facility] Patient Portal discussed and encouraged during today's visit.

## 2022-03-18 ENCOUNTER — PATIENT MESSAGE (OUTPATIENT)
Dept: OBSTETRICS AND GYNECOLOGY | Facility: CLINIC | Age: 57
End: 2022-03-18
Payer: COMMERCIAL

## 2022-03-18 DIAGNOSIS — Z12.31 VISIT FOR SCREENING MAMMOGRAM: Primary | ICD-10-CM

## 2022-03-22 ENCOUNTER — PATIENT MESSAGE (OUTPATIENT)
Dept: OBSTETRICS AND GYNECOLOGY | Facility: CLINIC | Age: 57
End: 2022-03-22
Payer: COMMERCIAL

## 2022-03-30 ENCOUNTER — OFFICE VISIT (OUTPATIENT)
Dept: INTERNAL MEDICINE | Facility: CLINIC | Age: 57
End: 2022-03-30
Payer: COMMERCIAL

## 2022-03-30 VITALS
OXYGEN SATURATION: 99 % | SYSTOLIC BLOOD PRESSURE: 102 MMHG | DIASTOLIC BLOOD PRESSURE: 70 MMHG | WEIGHT: 133.5 LBS | BODY MASS INDEX: 23.65 KG/M2 | HEIGHT: 63 IN | HEART RATE: 80 BPM

## 2022-03-30 DIAGNOSIS — Z00.00 ANNUAL PHYSICAL EXAM: Primary | ICD-10-CM

## 2022-03-30 DIAGNOSIS — R00.2 PALPITATIONS: ICD-10-CM

## 2022-03-30 DIAGNOSIS — K21.9 GERD WITHOUT ESOPHAGITIS: ICD-10-CM

## 2022-03-30 PROCEDURE — 3074F SYST BP LT 130 MM HG: CPT | Mod: CPTII,S$GLB,, | Performed by: INTERNAL MEDICINE

## 2022-03-30 PROCEDURE — 3008F BODY MASS INDEX DOCD: CPT | Mod: CPTII,S$GLB,, | Performed by: INTERNAL MEDICINE

## 2022-03-30 PROCEDURE — 1160F RVW MEDS BY RX/DR IN RCRD: CPT | Mod: CPTII,S$GLB,, | Performed by: INTERNAL MEDICINE

## 2022-03-30 PROCEDURE — 1160F PR REVIEW ALL MEDS BY PRESCRIBER/CLIN PHARMACIST DOCUMENTED: ICD-10-PCS | Mod: CPTII,S$GLB,, | Performed by: INTERNAL MEDICINE

## 2022-03-30 PROCEDURE — 1159F PR MEDICATION LIST DOCUMENTED IN MEDICAL RECORD: ICD-10-PCS | Mod: CPTII,S$GLB,, | Performed by: INTERNAL MEDICINE

## 2022-03-30 PROCEDURE — 3078F PR MOST RECENT DIASTOLIC BLOOD PRESSURE < 80 MM HG: ICD-10-PCS | Mod: CPTII,S$GLB,, | Performed by: INTERNAL MEDICINE

## 2022-03-30 PROCEDURE — 99396 PREV VISIT EST AGE 40-64: CPT | Mod: S$GLB,,, | Performed by: INTERNAL MEDICINE

## 2022-03-30 PROCEDURE — 3008F PR BODY MASS INDEX (BMI) DOCUMENTED: ICD-10-PCS | Mod: CPTII,S$GLB,, | Performed by: INTERNAL MEDICINE

## 2022-03-30 PROCEDURE — 99396 PR PREVENTIVE VISIT,EST,40-64: ICD-10-PCS | Mod: S$GLB,,, | Performed by: INTERNAL MEDICINE

## 2022-03-30 PROCEDURE — 3074F PR MOST RECENT SYSTOLIC BLOOD PRESSURE < 130 MM HG: ICD-10-PCS | Mod: CPTII,S$GLB,, | Performed by: INTERNAL MEDICINE

## 2022-03-30 PROCEDURE — 99999 PR PBB SHADOW E&M-EST. PATIENT-LVL III: ICD-10-PCS | Mod: PBBFAC,,, | Performed by: INTERNAL MEDICINE

## 2022-03-30 PROCEDURE — 99999 PR PBB SHADOW E&M-EST. PATIENT-LVL III: CPT | Mod: PBBFAC,,, | Performed by: INTERNAL MEDICINE

## 2022-03-30 PROCEDURE — 1159F MED LIST DOCD IN RCRD: CPT | Mod: CPTII,S$GLB,, | Performed by: INTERNAL MEDICINE

## 2022-03-30 PROCEDURE — 3078F DIAST BP <80 MM HG: CPT | Mod: CPTII,S$GLB,, | Performed by: INTERNAL MEDICINE

## 2022-03-30 NOTE — PROGRESS NOTES
[facility] Note    Chief Complaint      Chief Complaint   Patient presents with    Annual Exam       History of Present Illness      Melina Jean is a 56 y.o. female with chronic conditions of GERD, palpitations who presents today for: annual preventative visit.    Chronic palpitations: Established with cardiology, Dr. Mendez.  Saw Dr. Smith, cardiology on the [facility] who started on metoprolol.  Stopped metoprolol and trying magnesium.  GERD: Controlled on famotidine daily.  Had EGD last year with Dr. Cerrato.    Diet: Prepares own food mostly.  Limiting fatty foods and carbs.  Drinks plenty water.    Exercise: stays active.  Elliptical and walks.    Denies drinking and driving, drinking more than 4 drinks on occasion, drug use.     Flu shot UTD.  Td 2019.  Shingrix UTD.  COVID vaccine UTD.  Pneumonia vaccine due age 67.  Mammogram 2021, scheduled for repeat.  Sees Dr. Qureshi.  DEXA UTD 2018.   Cscope Dr. Cerrato, 2019, no polyps, 5 yr interval for family history of colon cancer in mother.     Past Medical History:  Past Medical History:   Diagnosis Date    GERD (gastroesophageal reflux disease)     Osteoporosis        Past Surgical History:   has a past surgical history that includes Cholecystectomy and Tonsillectomy.    Family History:  family history includes COPD in her father and mother; Cancer in her mother; Colon cancer in her mother; No Known Problems in her brother.     Social History:  Social History     Tobacco Use    Smoking status: Never Smoker    Smokeless tobacco: Never Used   Substance Use Topics    Alcohol use: Yes     Alcohol/week: 4.0 standard drinks     Types: 1 Glasses of wine, 3 Cans of beer per week     Comment: Socially not every week    Drug use: Never       I personally reviewed all past medical, surgical, social and family history.    Review of Systems   Constitutional: Negative for chills, fever and malaise/fatigue.   Respiratory: Negative for  "shortness of breath.    Cardiovascular: Negative for chest pain.   Gastrointestinal: Negative for constipation, diarrhea, nausea and vomiting.   Skin: Negative for rash.   Neurological: Negative for weakness.   All other systems reviewed and are negative.       Medications:  Outpatient Encounter Medications as of 3/30/2022   Medication Sig Dispense Refill    alendronate (FOSAMAX) 70 MG tablet Take once a week on empty stomach, do not recline for 60 min after 12 tablet 3    ascorbic acid, vitamin C, 500 mg Chew Take by mouth Daily.      biotin 10,000 mcg Cap Take 1 capsule by mouth once daily.      cyanocobalamin-cobamamide (B12) 5,000-100 mcg Lozg Take by mouth Daily.      eszopiclone (LUNESTA) 3 mg Tab Take 3 mg by mouth nightly as needed.      famotidine (PEPCID) 20 MG tablet Take 20 mg by mouth Daily.      fluticasone propionate (FLONASE) 50 mcg/actuation nasal spray       metoprolol tartrate (LOPRESSOR) 25 MG tablet Take 25 mg by mouth once daily.       No facility-administered encounter medications on file as of 3/30/2022.       Allergies:  Review of patient's allergies indicates:  No Known Allergies    Health Maintenance:  Immunization History   Administered Date(s) Administered    COVID-19, vector-nr, rS-Ad26, PF (Cristy) 04/01/2021    Influenza 10/15/2021    Influenza - Quadrivalent 10/03/2017, 11/19/2021    Influenza - Quadrivalent - MDCK - PF 10/17/2020    Influenza - Quadrivalent - PF *Preferred* (6 months and older) 09/24/2019    Tdap 11/22/2019    Zoster Recombinant 09/27/2018, 11/29/2018      Health Maintenance   Topic Date Due    Mammogram  03/16/2022    Lipid Panel  12/10/2026    TETANUS VACCINE  11/22/2029    Hepatitis C Screening  Completed        Physical Exam      Vital Signs  Pulse: 80  SpO2: 99 %  BP: 102/70  BP Location: Right arm  Patient Position: Sitting  Pain Score: 0-No pain  Height and Weight  Height: 5' 3" (160 cm)  Weight: 60.6 kg (133 lb 7.8 oz)  BSA (Calculated - " sq m): 1.64 sq meters  BMI (Calculated): 23.7  Weight in (lb) to have BMI = 25: 140.8]    Physical Exam  Vitals reviewed.   Constitutional:       Appearance: She is well-developed.   HENT:      Head: Normocephalic and atraumatic.      Right Ear: External ear normal.      Left Ear: External ear normal.   Cardiovascular:      Rate and Rhythm: Normal rate and regular rhythm.      Heart sounds: Normal heart sounds. No murmur heard.  Pulmonary:      Effort: Pulmonary effort is normal.      Breath sounds: Normal breath sounds. No wheezing or rales.   Abdominal:      General: Bowel sounds are normal. There is no distension.      Palpations: Abdomen is soft.      Tenderness: There is no abdominal tenderness.          Laboratory:  CBC:  Recent Labs   Lab 04/27/21 0758   WBC 4.1   RBC 4.32   Hemoglobin 13.8   Hematocrit 41.4   Platelets 313   MCV 95.8   MCH 31.9   MCHC 33.3     CMP:  Recent Labs   Lab 04/27/21  0758 05/01/21  0950   Glucose 86 93   Calcium 9.9 9.8   Albumin 4.4  --    ALBUMIN  --  4.9   Total Protein 6.7 7.2   Sodium 143 139   Potassium 4.2 4.5   CO2 28 29   Chloride 106 99   BUN 12 14   ALT 11  --    AST 13  --    Total Bilirubin 0.5  --      URINALYSIS:       LIPIDS:  Recent Labs   Lab 11/23/19  0813 04/27/21  0758   TSH 1.35 1.73   HDL 75 62   Cholesterol 170 173   Triglycerides 68 116   LDL Cholesterol 81 90   HDL/Cholesterol Ratio 2.3 2.8   Non HDL Chol. (LDL+VLDL) 95 111     TSH:  Recent Labs   Lab 11/23/19  0813 04/27/21  0758   TSH 1.35 1.73     A1C:        Assessment/Plan     Melina Jean is a 56 y.o.female with:    1. Annual physical exam  Discussed diet and exercise, vaccines and cancer screening, risk factors.  Screening labs deferred 2/2 cardiology evaluation.    2. Palpitations  Cont magnesium.  F/U with Dr. Mendez.  STill waiting on echo report from DR. Smith.  3. GERD without esophagitis  Continue current meds.      Chronic conditions status updated as per HPI.  Other than changes  above, cont current medications and maintain follow up with specialists.  Follow up in about 1 year (around 3/30/2023) for Annual preventative visit.    Future Appointments   Date Time Provider Department Center   7/28/2022  2:30 PM Karan Blanc MD [facility]       Eliud Gerard MD  [facility]

## 2022-06-02 ENCOUNTER — PATIENT MESSAGE (OUTPATIENT)
Dept: ENDOCRINOLOGY | Facility: CLINIC | Age: 57
End: 2022-06-02
Payer: COMMERCIAL

## 2022-06-03 ENCOUNTER — OFFICE VISIT (OUTPATIENT)
Dept: ENDOCRINOLOGY | Facility: CLINIC | Age: 57
End: 2022-06-03
Payer: COMMERCIAL

## 2022-06-03 VITALS
BODY MASS INDEX: 24.01 KG/M2 | HEIGHT: 63 IN | OXYGEN SATURATION: 94 % | HEART RATE: 55 BPM | DIASTOLIC BLOOD PRESSURE: 70 MMHG | WEIGHT: 135.5 LBS | SYSTOLIC BLOOD PRESSURE: 100 MMHG | RESPIRATION RATE: 18 BRPM

## 2022-06-03 DIAGNOSIS — M81.0 AGE-RELATED OSTEOPOROSIS WITHOUT CURRENT PATHOLOGICAL FRACTURE: Primary | ICD-10-CM

## 2022-06-03 DIAGNOSIS — R00.2 PALPITATIONS: ICD-10-CM

## 2022-06-03 DIAGNOSIS — M81.0 OSTEOPOROSIS, UNSPECIFIED OSTEOPOROSIS TYPE, UNSPECIFIED PATHOLOGICAL FRACTURE PRESENCE: ICD-10-CM

## 2022-06-03 PROCEDURE — 99214 OFFICE O/P EST MOD 30 MIN: CPT | Mod: S$GLB,,, | Performed by: INTERNAL MEDICINE

## 2022-06-03 PROCEDURE — 1160F PR REVIEW ALL MEDS BY PRESCRIBER/CLIN PHARMACIST DOCUMENTED: ICD-10-PCS | Mod: CPTII,S$GLB,, | Performed by: INTERNAL MEDICINE

## 2022-06-03 PROCEDURE — 3008F BODY MASS INDEX DOCD: CPT | Mod: CPTII,S$GLB,, | Performed by: INTERNAL MEDICINE

## 2022-06-03 PROCEDURE — 3008F PR BODY MASS INDEX (BMI) DOCUMENTED: ICD-10-PCS | Mod: CPTII,S$GLB,, | Performed by: INTERNAL MEDICINE

## 2022-06-03 PROCEDURE — 3074F PR MOST RECENT SYSTOLIC BLOOD PRESSURE < 130 MM HG: ICD-10-PCS | Mod: CPTII,S$GLB,, | Performed by: INTERNAL MEDICINE

## 2022-06-03 PROCEDURE — 1159F PR MEDICATION LIST DOCUMENTED IN MEDICAL RECORD: ICD-10-PCS | Mod: CPTII,S$GLB,, | Performed by: INTERNAL MEDICINE

## 2022-06-03 PROCEDURE — 3078F DIAST BP <80 MM HG: CPT | Mod: CPTII,S$GLB,, | Performed by: INTERNAL MEDICINE

## 2022-06-03 PROCEDURE — 99999 PR PBB SHADOW E&M-EST. PATIENT-LVL V: CPT | Mod: PBBFAC,,, | Performed by: INTERNAL MEDICINE

## 2022-06-03 PROCEDURE — 3078F PR MOST RECENT DIASTOLIC BLOOD PRESSURE < 80 MM HG: ICD-10-PCS | Mod: CPTII,S$GLB,, | Performed by: INTERNAL MEDICINE

## 2022-06-03 PROCEDURE — 1160F RVW MEDS BY RX/DR IN RCRD: CPT | Mod: CPTII,S$GLB,, | Performed by: INTERNAL MEDICINE

## 2022-06-03 PROCEDURE — 1159F MED LIST DOCD IN RCRD: CPT | Mod: CPTII,S$GLB,, | Performed by: INTERNAL MEDICINE

## 2022-06-03 PROCEDURE — 99214 PR OFFICE/OUTPT VISIT, EST, LEVL IV, 30-39 MIN: ICD-10-PCS | Mod: S$GLB,,, | Performed by: INTERNAL MEDICINE

## 2022-06-03 PROCEDURE — 3074F SYST BP LT 130 MM HG: CPT | Mod: CPTII,S$GLB,, | Performed by: INTERNAL MEDICINE

## 2022-06-03 PROCEDURE — 99999 PR PBB SHADOW E&M-EST. PATIENT-LVL V: ICD-10-PCS | Mod: PBBFAC,,, | Performed by: INTERNAL MEDICINE

## 2022-06-03 RX ORDER — MAGNESIUM 200 MG
TABLET ORAL ONCE
COMMUNITY
End: 2022-08-03

## 2022-06-03 RX ORDER — ZINC GLUCONATE 50 MG
50 TABLET ORAL DAILY
COMMUNITY

## 2022-06-03 NOTE — PROGRESS NOTES
ENDOCRINOLOGY CLINIC  FOLLOW UP  06/03/2022     The patient's last visit with me was on 4/30/2021.     Subjective:      Reason for referal: referred by No ref. provider found for management of low bone density.     HPI:   Melina Jean is a 56 y.o. female who presents for evaluation of Osteoporosis.    Unfortunately since her last visit her father passed away.  She was having some palpitations and was started on metoprolol by cardiology, now improved but still persistent.      She was diagnosed with osteoporosis in 2018 based on low bone density on L-spine, most recent dexa 3/2021 with decline.    At last visit was started on weekly fosamax w/o side effects  Exercising regularly with walking, weights, elliptical            Secondary osteoporosis workup:   postmenopausal estrogen deficiency- menopause age 53  Lab Results   Component Value Date    CALCIUM 9.8 05/01/2021    ALBUMIN 4.9 05/01/2021    ESTGFRAFRICA 118 04/27/2021    EGFRNONAA 102 04/27/2021    PHOS 4.2 05/01/2021    PTH 44 05/01/2021     Previous Treatment:   Fosamax started 5/2021  History of previous fracture: Yes stress fracture in right leg, related to spinning?  Parent with fractured hip: No  Smoking status: no  Glucocorticoid use: No  History of RA: No  Alcohol 3 or more/day: No  Nephrolithiasis: Yes couple years ago had kidney stone once  Dental up to date: yes- no upcoming proceedure   Calcium - calcium citrate 600 mg daily with about 1 serving of dairy daily   GERD - taking ranitidine BID - with gerd uncontrolled    Height loss - 1/4 inch    Past Medical History:   Diagnosis Date    GERD (gastroesophageal reflux disease)     Osteoporosis        Past Surgical History:   Procedure Laterality Date    CHOLECYSTECTOMY      TONSILLECTOMY         Review of patient's allergies indicates:  No Known Allergies      Current Outpatient Medications:     alendronate (FOSAMAX) 70 MG tablet, TAKE ONE TABLET ONCE A WEEK ON AN EMPTY STOMACH. DO NOT RECLINE  "FOR 60 MINUTES AFTER, Disp: 12 tablet, Rfl: 3    ascorbic acid, vitamin C, 500 mg Chew, Take by mouth Daily., Disp: , Rfl:     biotin 10,000 mcg Cap, Take 1 capsule by mouth once daily., Disp: , Rfl:     COLLAGEN MISC, by Misc.(Non-Drug; Combo Route) route once daily., Disp: , Rfl:     cyanocobalamin-cobamamide (B12) 5,000-100 mcg Lozg, Take by mouth Daily., Disp: , Rfl:     famotidine (PEPCID) 20 MG tablet, Take 20 mg by mouth Daily., Disp: , Rfl:     fluticasone propionate (FLONASE) 50 mcg/actuation nasal spray, , Disp: , Rfl:     magnesium 200 mg Tab, Take by mouth once., Disp: , Rfl:     metoprolol tartrate (LOPRESSOR) 25 MG tablet, Take 25 mg by mouth once daily., Disp: , Rfl:     zinc gluconate 50 mg tablet, Take 50 mg by mouth once daily., Disp: , Rfl:     eszopiclone (LUNESTA) 3 mg Tab, Take 3 mg by mouth nightly as needed., Disp: , Rfl:     Social History     Socioeconomic History    Marital status:    Tobacco Use    Smoking status: Never Smoker    Smokeless tobacco: Never Used   Substance and Sexual Activity    Alcohol use: Yes     Alcohol/week: 4.0 standard drinks     Types: 1 Glasses of wine, 3 Cans of beer per week     Comment: Socially not every week    Drug use: Never    Sexual activity: Yes     Partners: Male     Birth control/protection: None       Family History   Problem Relation Age of Onset    Colon cancer Mother     Cancer Mother         Colon    COPD Mother         Smoker    COPD Father          2021 due to Covid-19 (Afib)    No Known Problems Brother        ROS:  See HPI    Objective:   Physical Exam   /70 (BP Location: Right arm, Patient Position: Sitting, BP Method: Small (Manual))   Pulse (!) 55   Resp 18   Ht 5' 3" (1.6 m)   Wt 61.4 kg (135 lb 7.6 oz)   SpO2 (!) 94%   BMI 24.00 kg/m²   Wt Readings from Last 3 Encounters:   22 61.4 kg (135 lb 7.6 oz)   22 60.6 kg (133 lb 7.8 oz)   22 58.7 kg (129 lb 8.3 oz) "   ]    Constitutional:  Pleasant,  in no acute distress.   HENT:   Eyes:     No scleral icterus.   Respiratory:   Effort normal   Neurological:  normal speech  Psych:  Normal mood and affect.    No tremor, normal thyroid exam      LABORATORY REVIEW:    Chemistry        Component Value Date/Time     05/01/2021 0950    K 4.5 05/01/2021 0950    CL 99 05/01/2021 0950    CO2 29 05/01/2021 0950    BUN 14 05/01/2021 0950    CREATININE 0.6 05/01/2021 0950    GLU 93 05/01/2021 0950        Component Value Date/Time    CALCIUM 9.8 05/01/2021 0950    AST 13 04/27/2021 0758    ALT 11 04/27/2021 0758    BILITOT 0.5 04/27/2021 0758    ESTGFRAFRICA 118 04/27/2021 0758    EGFRNONAA 102 04/27/2021 0758        No results found for: HGBA1C    Assessment/Plan:     Problem List Items Addressed This Visit        1 - High    Osteoporosis - Primary     Risks include low weight,  race, menopause, PPI therapy.      Reviewed basics of quantity versus quality  Reassuring she is not having fragility fractures (would not consider stress fracture to be related to osteoporosis)  Secondary workup negative but no vitamin d level checked so will check now     RDA of calcium (4078-2708 mg /day in divided doses)   Calcium from food sources preferred  Fall precautions emphasized  +weight bearing exercise    Continue fosamax as she is not having any GI side effects.  Repeat dexa at [facility] in 3/2023    Discussed optimal duration of bisphosphonate use is unknown - drug holiday after 5-10 po versus drug hold ay after 3 reclast treatment              Relevant Orders    Vitamin D    Renal Function Panel    DXA Bone Density Spine And Hip       2     Palpitations     Check TSH to r/o hyperthyroidism            Relevant Orders    TSH        Return to clinic in 12 months  Labs at [facility] now      Karan Blanc MD

## 2022-06-03 NOTE — PATIENT INSTRUCTIONS
Please get bone density scan at [facility] after 3/16/23  Have them send me the results including the actual pictures.    Fax number:  [phone]     For calcium intake:  I advise you get 1200 mg per day of calcium, split up over the day into 2 to 4 divided doses.  This amount includes calcium from both dietary sources and/or calcium supplements, and it is best to get smaller amounts throughout the day rather than all of the calcium at one time; this allows for better absorption of the calcium.     To estimate calcium content from a nutrition label, the label lists the % calcium in that food.   For example, the label for an 8 oz glass of milk lists the calcium content as 30%.  This is equivalent to 300 mg of calcium (multiply the % by 10 to get the mg of calcium per serving).  It is best to try to get the majority of calcium intake from the diet.  I've included a table below of some calcium-rich foods.    If you are not getting enough calcium in the diet, then you can add low doses of calcium supplements.  For calcium supplements, your body can only absorb about 500-600 mg of calcium at one time.  Thus, it is best to split the tablets up over the course of a day.  It is also best to avoid taking calcium supplements at the same time as a calcium-rich food to maximize your calcium absorption.  Calcium carbonate is best taken with or after a meal.  Calcium citrate can be taken on an empty stomach or with/after a meal.  Please look at any multivitamin you are taking as these often usually contain calcium as well.    Estimated Calcium Content of Foods:  Produce  Serving Size Estimated Calcium*    Trino greens, frozen 8 oz 360 mg   Broccoli patti 8 oz 200 mg   Kale, frozen 8 oz 180 mg   Soy Beans, green, boiled 8 oz 175 mg   Bok Jung, cooked, boiled 8 oz 160 mg   Figs, dried 2 figs 65 mg   Broccoli, fresh, cooked 8 oz 60 mg   Oranges 1 whole 55 mg   Seafood Serving Size Estimated Calcium*    Sardines, canned with bones  3 oz 325 mg   [place], canned with bones 3 oz 180 mg   Shrimp, canned 3 oz 125 mg   Dairy Serving Size Estimated Calcium*    Ricotta, part-skim 4 oz 335 mg   Yogurt, plain, low-fat 6 oz 310 mg   Milk, skim, low-fat, whole 8 oz 300 mg   Yogurt with fruit, low-fat 6 oz 260 mg   Mozzarella, part-skim 1 oz 210 mg   Cheddar 1 oz 205 mg   Yogurt, Greek 6 oz 200 mg   American Cheese 1 oz 195 mg   Feta Cheese 4 oz 140 mg   Cottage Cheese, 2% 4 oz 105 mg   Frozen yogurt, vanilla 8 oz 105 mg   Ice Cream, vanilla 8 oz 85 mg   Parmesan 1 tbsp 55 mg   Fortified Food Serving Size Estimated Calcium*   [place] milk, rice milk or soy milk, fortified 8 oz 300 mg   Orange juice and other fruit juices, fortified 8 oz 300 mg   Tofu, prepared with calcium 4 oz 205 mg   Waffle, frozen, fortified 2 pieces 200 mg   Oatmeal, fortified 1 packet 140 mg   English muffin, fortified 1 muffin 100 mg   Cereal, fortified 8 oz 100-1,000 mg   Other Serving Size Estimated Calcium*   Mac & cheese, frozen 1 package 325 mg   Pizza, cheese, frozen 1 serving 115 mg   Pudding, chocolate, prepared with 2% milk 4 oz 160 mg   Beans, baked, canned 4 oz 160 mg   *The calcium content listed for most foods is estimated and can vary due to multiple factors. Check the food label to determine how much calcium is in a particular product.  If you read the nutrition label for a food source, it lists the % calcium in that food.  For an 8 oz glass of milk, for example, the label states calcium 30%.  This is equivalent to 300 mg of calcium (multiply the listed number by 10).   **Table from the National Osteoporosis Foundation

## 2022-06-03 NOTE — ASSESSMENT & PLAN NOTE
Risks include low weight,  race, menopause, PPI therapy.      Reviewed basics of quantity versus quality  Reassuring she is not having fragility fractures (would not consider stress fracture to be related to osteoporosis)  Secondary workup negative but no vitamin d level checked so will check now     RDA of calcium (7916-5356 mg /day in divided doses)   Calcium from food sources preferred  Fall precautions emphasized  +weight bearing exercise    Continue fosamax as she is not having any GI side effects.  Repeat dexa at [facility] in 3/2023    Discussed optimal duration of bisphosphonate use is unknown - drug holiday after 5-10 po versus drug hold ay after 3 reclast treatment

## 2022-06-05 LAB
25(OH)D3 SERPL-MCNC: 44 NG/ML (ref 30–100)
ALBUMIN: 4.7 G/DL (ref 3.5–5)
BUN BLD-MCNC: 12 MG/DL (ref 7–21)
CALCIUM SERPL-MCNC: 9.6 MG/DL (ref 8.5–10.4)
CHLORIDE SERPL-SCNC: 104 MMOL/L (ref 98–107)
CO2 SERPL-SCNC: 28 MMOL/L (ref 21–31)
CREAT SERPL-MCNC: 0.69 MG/DL (ref 0.5–1)
EGFR: 114 ML/MIN
GFR: 99 ML/MIN
GLUCOSE SERPL-MCNC: 85 MG/DL (ref 70–100)
PHOSPHATE FLD-MCNC: 3.4 MG/DL (ref 2.4–4.4)
POTASSIUM SERPL-SCNC: 4.4 MMOL/L (ref 3.5–5)
SODIUM BLD-SCNC: 143 MMOL/L (ref 135–145)
TSH SERPL DL<=0.005 MIU/L-ACNC: 1.2 UIU/ML (ref 0.35–4)

## 2022-08-03 ENCOUNTER — OFFICE VISIT (OUTPATIENT)
Dept: CARDIOLOGY | Facility: CLINIC | Age: 57
End: 2022-08-03
Payer: COMMERCIAL

## 2022-08-03 VITALS
HEIGHT: 63 IN | HEART RATE: 63 BPM | WEIGHT: 134.06 LBS | DIASTOLIC BLOOD PRESSURE: 88 MMHG | SYSTOLIC BLOOD PRESSURE: 133 MMHG | BODY MASS INDEX: 23.75 KG/M2

## 2022-08-03 DIAGNOSIS — I34.1 MITRAL VALVE PROLAPSE: ICD-10-CM

## 2022-08-03 DIAGNOSIS — R00.2 PALPITATIONS: Primary | ICD-10-CM

## 2022-08-03 DIAGNOSIS — Z03.89 CORONARY ARTERY DISEASE EXCLUDED: ICD-10-CM

## 2022-08-03 PROCEDURE — 3075F SYST BP GE 130 - 139MM HG: CPT | Mod: CPTII,S$GLB,, | Performed by: INTERNAL MEDICINE

## 2022-08-03 PROCEDURE — 1159F MED LIST DOCD IN RCRD: CPT | Mod: CPTII,S$GLB,, | Performed by: INTERNAL MEDICINE

## 2022-08-03 PROCEDURE — 3075F PR MOST RECENT SYSTOLIC BLOOD PRESS GE 130-139MM HG: ICD-10-PCS | Mod: CPTII,S$GLB,, | Performed by: INTERNAL MEDICINE

## 2022-08-03 PROCEDURE — 1159F PR MEDICATION LIST DOCUMENTED IN MEDICAL RECORD: ICD-10-PCS | Mod: CPTII,S$GLB,, | Performed by: INTERNAL MEDICINE

## 2022-08-03 PROCEDURE — 1160F RVW MEDS BY RX/DR IN RCRD: CPT | Mod: CPTII,S$GLB,, | Performed by: INTERNAL MEDICINE

## 2022-08-03 PROCEDURE — 3079F PR MOST RECENT DIASTOLIC BLOOD PRESSURE 80-89 MM HG: ICD-10-PCS | Mod: CPTII,S$GLB,, | Performed by: INTERNAL MEDICINE

## 2022-08-03 PROCEDURE — 3079F DIAST BP 80-89 MM HG: CPT | Mod: CPTII,S$GLB,, | Performed by: INTERNAL MEDICINE

## 2022-08-03 PROCEDURE — 99214 OFFICE O/P EST MOD 30 MIN: CPT | Mod: S$GLB,,, | Performed by: INTERNAL MEDICINE

## 2022-08-03 PROCEDURE — 99214 PR OFFICE/OUTPT VISIT, EST, LEVL IV, 30-39 MIN: ICD-10-PCS | Mod: S$GLB,,, | Performed by: INTERNAL MEDICINE

## 2022-08-03 PROCEDURE — 3008F BODY MASS INDEX DOCD: CPT | Mod: CPTII,S$GLB,, | Performed by: INTERNAL MEDICINE

## 2022-08-03 PROCEDURE — 1160F PR REVIEW ALL MEDS BY PRESCRIBER/CLIN PHARMACIST DOCUMENTED: ICD-10-PCS | Mod: CPTII,S$GLB,, | Performed by: INTERNAL MEDICINE

## 2022-08-03 PROCEDURE — 99999 PR PBB SHADOW E&M-EST. PATIENT-LVL III: ICD-10-PCS | Mod: PBBFAC,,, | Performed by: INTERNAL MEDICINE

## 2022-08-03 PROCEDURE — 3008F PR BODY MASS INDEX (BMI) DOCUMENTED: ICD-10-PCS | Mod: CPTII,S$GLB,, | Performed by: INTERNAL MEDICINE

## 2022-08-03 PROCEDURE — 99999 PR PBB SHADOW E&M-EST. PATIENT-LVL III: CPT | Mod: PBBFAC,,, | Performed by: INTERNAL MEDICINE

## 2022-08-03 RX ORDER — MAGNESIUM 200 MG
400 TABLET ORAL DAILY
COMMUNITY
End: 2023-06-26

## 2022-08-03 NOTE — PROGRESS NOTES
"Subjective:   08/03/2022     Patient ID:  Melina Jean is a 57 y.o. female who presents for evaulation of Palpitations and Coronary Artery Disease      She comes in for follow-up.  She has been doing quite well.  Her palpitations have improved.  She tolerates magnesium oxide 200 mg daily.  She has she is not having chest pains or tightness, PND or orthopnea.  She exercises without difficulty.  Cardiac risk is low    The 10-year ASCVD risk score (Cullman ANABEL Jr., et al., 2013) is: 1.6%    Values used to calculate the score:      Age: 57 years      Sex: Female      Is Non- : No      Diabetic: No      Tobacco smoker: No      Systolic Blood Pressure: 133 mmHg      Is BP treated: No      HDL Cholesterol: 62 mg/dL      Total Cholesterol: 173 mg/dL        Prior note:   She comes in for a 2nd opinion.  She was seen by another cardiologist on the [facility] with palpitations and history of mitral valve prolapse.  EKG did show sinus rhythm with PACs.  She was begun on metoprolol tartrate 25 mg daily, she underwent a stress test, there were no ST changes, it was noted that there was "some anterior reversibility blood breast and movement attenuation in ".  Her LDL was 112, HDL 71. A coronary calcium score was obtained, this was 0.    She has no complaints of chest pains or tightness.  She does have occasional palpitations.  She had had chest discomfort, but had blamed on stress after the death of her father.    She has does not have hypertension, diabetes.  She has never smoked cigarettes.        Palpitations   Associated symptoms include an irregular heartbeat. Pertinent negatives include no chest pain, near-syncope or syncope.   Coronary Artery Disease  Symptoms include palpitations. Pertinent negatives include no chest pain or leg swelling.       Past Medical History:   Diagnosis Date    GERD (gastroesophageal reflux disease)     Osteoporosis        Review of patient's allergies indicates:  No " Known Allergies      Current Outpatient Medications:     alendronate (FOSAMAX) 70 MG tablet, TAKE ONE TABLET ONCE A WEEK ON AN EMPTY STOMACH. DO NOT RECLINE FOR 60 MINUTES AFTER, Disp: 12 tablet, Rfl: 3    ascorbic acid, vitamin C, 500 mg Chew, Take by mouth Daily., Disp: , Rfl:     biotin 10,000 mcg Cap, Take 1 capsule by mouth once daily., Disp: , Rfl:     COLLAGEN MISC, 100 mcg by Misc.(Non-Drug; Combo Route) route once daily at 6am., Disp: , Rfl:     cyanocobalamin-cobamamide (B12) 5,000-100 mcg Lozg, Take by mouth Daily., Disp: , Rfl:     eszopiclone (LUNESTA) 3 mg Tab, Take 3 mg by mouth nightly as needed., Disp: , Rfl:     famotidine (PEPCID) 20 MG tablet, Take 20 mg by mouth Daily., Disp: , Rfl:     fluticasone propionate (FLONASE) 50 mcg/actuation nasal spray, 2 sprays by Each Nostril route as needed., Disp: , Rfl:     magnesium 200 mg Tab, Take 400 mg by mouth Daily., Disp: , Rfl:     zinc gluconate 50 mg tablet, Take 50 mg by mouth once daily., Disp: , Rfl:      Objective:   Review of Systems   Cardiovascular: Positive for irregular heartbeat and palpitations. Negative for chest pain, claudication, cyanosis, dyspnea on exertion, leg swelling, near-syncope, orthopnea, paroxysmal nocturnal dyspnea and syncope.       Vitals:    08/03/22 0806   BP: 133/88   Pulse: 63       Physical Exam  Vitals reviewed.   Constitutional:       General: She is not in acute distress.     Appearance: She is well-developed.   HENT:      Head: Normocephalic and atraumatic.      Nose: Nose normal.   Eyes:      Conjunctiva/sclera: Conjunctivae normal.      Pupils: Pupils are equal, round, and reactive to light.   Neck:      Vascular: No JVD.   Cardiovascular:      Rate and Rhythm: Normal rate and regular rhythm.      Pulses: Intact distal pulses.      Heart sounds: Normal heart sounds. No murmur heard.    No friction rub. No gallop.   Pulmonary:      Effort: Pulmonary effort is normal. No respiratory distress.      Breath  sounds: Normal breath sounds. No wheezing or rales.   Chest:      Chest wall: No tenderness.   Abdominal:      General: Bowel sounds are normal. There is no distension.      Palpations: Abdomen is soft.      Tenderness: There is no abdominal tenderness.   Musculoskeletal:         General: No tenderness or deformity. Normal range of motion.      Cervical back: Normal range of motion and neck supple.   Skin:     General: Skin is warm and dry.      Findings: No erythema or rash.   Neurological:      Mental Status: She is alert and oriented to person, place, and time.      Cranial Nerves: No cranial nerve deficit.      Motor: No abnormal muscle tone.      Coordination: Coordination normal.   Psychiatric:         Behavior: Behavior normal.         Thought Content: Thought content normal.         Judgment: Judgment normal.           Lab Results   Component Value Date    CHOL 173 04/27/2021    CHOL 170 11/23/2019     Lab Results   Component Value Date    HDL 62 04/27/2021    HDL 75 11/23/2019     Lab Results   Component Value Date    LDLCALC 90 04/27/2021    LDLCALC 81 11/23/2019     Lab Results   Component Value Date    ALT 11 04/27/2021    AST 13 04/27/2021     Lab Results   Component Value Date    CREATININE 0.69 06/04/2022    BUN 12.0 06/04/2022     06/04/2022    K 4.4 06/04/2022    CO2 28 06/04/2022    CO2 29 05/01/2021    CO2 28 04/27/2021     Lab Results   Component Value Date    HGB 13.8 04/27/2021    HCT 41.4 04/27/2021                         Assessment and Plan:     Palpitations  Comments:  Improved with magnesium  Off of beta-blockers  Orders:  -     Echo; Future; Expected date: 08/10/2023    Mitral valve prolapse  Comments:  Echocardiogram Doppler 1 year  Orders:  -     Echo; Future; Expected date: 08/10/2023    Coronary artery disease excluded  Comments:  Calcium score is 0  Cardiac risk low           Follow up in about 1 year (around 8/3/2023).

## 2022-08-29 ENCOUNTER — PATIENT MESSAGE (OUTPATIENT)
Dept: INTERNAL MEDICINE | Facility: CLINIC | Age: 57
End: 2022-08-29
Payer: COMMERCIAL

## 2022-08-29 NOTE — TELEPHONE ENCOUNTER
Pt states she cannot get to office for visit. Requesting anxiety meds. LOV 3/30/22. Would a VV be ok?

## 2022-09-06 ENCOUNTER — OFFICE VISIT (OUTPATIENT)
Dept: INTERNAL MEDICINE | Facility: CLINIC | Age: 57
End: 2022-09-06
Payer: COMMERCIAL

## 2022-09-06 DIAGNOSIS — I51.81 TAKOTSUBO CARDIOMYOPATHY: Primary | ICD-10-CM

## 2022-09-06 DIAGNOSIS — F41.9 ANXIETY: ICD-10-CM

## 2022-09-06 PROCEDURE — 4010F PR ACE/ARB THEARPY RXD/TAKEN: ICD-10-PCS | Mod: CPTII,95,, | Performed by: INTERNAL MEDICINE

## 2022-09-06 PROCEDURE — 99214 PR OFFICE/OUTPT VISIT, EST, LEVL IV, 30-39 MIN: ICD-10-PCS | Mod: 95,,, | Performed by: INTERNAL MEDICINE

## 2022-09-06 PROCEDURE — 4010F ACE/ARB THERAPY RXD/TAKEN: CPT | Mod: CPTII,95,, | Performed by: INTERNAL MEDICINE

## 2022-09-06 PROCEDURE — 99214 OFFICE O/P EST MOD 30 MIN: CPT | Mod: 95,,, | Performed by: INTERNAL MEDICINE

## 2022-09-06 RX ORDER — ESCITALOPRAM OXALATE 10 MG/1
10 TABLET ORAL DAILY
Qty: 30 TABLET | Refills: 11 | Status: SHIPPED | OUTPATIENT
Start: 2022-09-06 | End: 2023-04-05 | Stop reason: SDUPTHER

## 2022-09-06 NOTE — PROGRESS NOTES
[facility] Virtual Visit Note    The patient location is: [place]  The chief complaint leading to consultation is: follow up ER visit for chest pain  Visit type: Virtual visit with synchronous audio and video  Total time spent with patient: 20 min  Each patient to whom he or she provides medical services by telemedicine is:  (1) informed of the relationship between the physician and patient and the respective role of any other health care provider with respect to management of the patient; and (2) notified that he or she may decline to receive medical services by telemedicine and may withdraw from such care at any time.      Chief Complaint    No chief complaint on file.      History of Present Illness      Melina Jean is a 57 y.o. female with chronic conditions of MIQUEL, GERD, anxiety, chronic neck and back pain who presents today for: ER visit for chest pain.  Evaluated at  ER and found to have elevated troponins, EF 20-25%, apical wall motion abnormality.  LHC did not show any obstructive CAD. Started on ASA, lipitor, bisoprolol, entresto. Life vest placed.  Seen by Dr. Aiken, cardiology in the hospital and will follow up until life vest is removed.  Previously followed by Dr. Mendez for chronic palpitations.  Recently discontinued metoprolol after taking recently to control palpitations. Recent CT calcium score 0.  Pt reports significant stress and anxiety lately, which may have partially contributed to above events.  Has taken lexapro in the past and would like to restart.    Past Medical History:  Past Medical History:   Diagnosis Date    GERD (gastroesophageal reflux disease)     Osteoporosis        Past Surgical History:   has a past surgical history that includes Cholecystectomy and Tonsillectomy.    Family History:  family history includes COPD in her father and mother; Cancer in her mother; Colon cancer in her mother; No Known Problems in her brother.     Social History:  Social  History     Tobacco Use    Smoking status: Never    Smokeless tobacco: Never   Substance Use Topics    Alcohol use: Yes     Alcohol/week: 4.0 standard drinks     Types: 1 Glasses of wine, 3 Cans of beer per week     Comment: Socially not every week    Drug use: Never       Review of Systems   HENT:  Negative for hearing loss.    Eyes:  Negative for discharge.   Respiratory:  Negative for wheezing.    Cardiovascular:  Negative for chest pain and palpitations.   Gastrointestinal:  Negative for blood in stool, constipation, diarrhea and vomiting.   Genitourinary:  Negative for dysuria and hematuria.   Musculoskeletal:  Negative for neck pain.   Neurological:  Negative for weakness and headaches.   Endo/Heme/Allergies:  Negative for polydipsia.      Medications:  Outpatient Encounter Medications as of 9/6/2022   Medication Sig Dispense Refill    alendronate (FOSAMAX) 70 MG tablet TAKE ONE TABLET ONCE A WEEK ON AN EMPTY STOMACH. DO NOT RECLINE FOR 60 MINUTES AFTER 12 tablet 3    ascorbic acid, vitamin C, 500 mg Chew Take by mouth Daily.      biotin 10,000 mcg Cap Take 1 capsule by mouth once daily.      COLLAGEN MISC 100 mcg by Misc.(Non-Drug; Combo Route) route once daily at 6am.      cyanocobalamin-cobamamide (B12) 5,000-100 mcg Lozg Take by mouth Daily.      EScitalopram oxalate (LEXAPRO) 10 MG tablet Take 1 tablet (10 mg total) by mouth once daily. 30 tablet 11    eszopiclone (LUNESTA) 3 mg Tab Take 3 mg by mouth nightly as needed.      famotidine (PEPCID) 20 MG tablet Take 20 mg by mouth Daily.      fluticasone propionate (FLONASE) 50 mcg/actuation nasal spray 2 sprays by Each Nostril route as needed.      magnesium 200 mg Tab Take 400 mg by mouth Daily.      zinc gluconate 50 mg tablet Take 50 mg by mouth once daily.       No facility-administered encounter medications on file as of 9/6/2022.       Allergies:  Review of patient's allergies indicates:  No Known Allergies    Health Maintenance:  Immunization History    Administered Date(s) Administered    COVID-19, vector-nr, rS-Ad26, PF (Cristy) 04/01/2021    Influenza 10/15/2021    Influenza - Quadrivalent 10/03/2017, 11/19/2021    Influenza - Quadrivalent - MDCK - PF 10/17/2020    Influenza - Quadrivalent - PF *Preferred* (6 months and older) 09/24/2019    Tdap 11/22/2019    Zoster Recombinant 09/27/2018, 11/29/2018      Health Maintenance   Topic Date Due    High Dose Statin  Never done    Mammogram  04/27/2023    Lipid Panel  08/27/2027    TETANUS VACCINE  11/22/2029    Hepatitis C Screening  Completed        Physical Exam       ]    Physical Exam  Constitutional:       General: She is not in acute distress.     Appearance: She is well-developed. She is not diaphoretic.   Eyes:      General: No scleral icterus.        Right eye: No discharge.         Left eye: No discharge.      Conjunctiva/sclera: Conjunctivae normal.   Pulmonary:      Effort: Pulmonary effort is normal. No respiratory distress.   Neurological:      Mental Status: She is alert and oriented to person, place, and time.   Psychiatric:         Mood and Affect: Mood normal.         Behavior: Behavior normal.         Thought Content: Thought content normal.         Judgment: Judgment normal.        Laboratory:  CBC:  Recent Labs   Lab 04/27/21 0758   WBC 4.1   RBC 4.32   Hemoglobin 13.8   Hematocrit 41.4   Platelets 313   MCV 95.8   MCH 31.9   MCHC 33.3     CMP:  Recent Labs   Lab 04/27/21  0758 04/27/21  0758 05/01/21  0950 06/04/22  1004   Glucose 86  --  93 85   Calcium 9.9  --  9.8 9.6   Albumin 4.4  --   --   --    ALBUMIN  --    < > 4.9 4.7   Total Protein 6.7  --  7.2  --    Sodium 143  --  139 143   Potassium 4.2  --  4.5 4.4   CO2 28  --  29 28   Chloride 106  --  99 104   BUN 12   < > 14 12.0   ALT 11  --   --   --    AST 13  --   --   --    Total Bilirubin 0.5  --   --   --     < > = values in this interval not displayed.     URINALYSIS:       LIPIDS:  Recent Labs   Lab 11/23/19  0813 04/27/21  0758  06/04/22  1004   TSH 1.35 1.73 1.20   HDL 75 62  --    Cholesterol 170 173  --    Triglycerides 68 116  --    LDL Cholesterol 81 90  --    HDL/Cholesterol Ratio 2.3 2.8  --    Non HDL Chol. (LDL+VLDL) 95 111  --      TSH:  Recent Labs   Lab 11/23/19  0813 04/27/21  0758 06/04/22  1004   TSH 1.35 1.73 1.20     A1C:        Assessment/Plan     Melina Jean is a 57 y.o.female with:    1. Takotsubo cardiomyopathy  Continue current meds.  F/U with cardiology  2. Anxiety  - EScitalopram oxalate (LEXAPRO) 10 MG tablet; Take 1 tablet (10 mg total) by mouth once daily.  Dispense: 30 tablet; Refill: 11   Restart lexapro.  Call after 1-2 weeks with progress report.    Chronic conditions status updated as per HPI.  Other than changes above, cont current medications and maintain follow up with specialists.  No follow-ups on file.    No future appointments.    Eliud Gerard MD  [facility]

## 2022-09-12 ENCOUNTER — PATIENT MESSAGE (OUTPATIENT)
Dept: CARDIOLOGY | Facility: CLINIC | Age: 57
End: 2022-09-12
Payer: COMMERCIAL

## 2023-02-06 ENCOUNTER — TELEPHONE (OUTPATIENT)
Dept: PRIMARY CARE CLINIC | Facility: CLINIC | Age: 58
End: 2023-02-06
Payer: COMMERCIAL

## 2023-02-06 ENCOUNTER — PATIENT MESSAGE (OUTPATIENT)
Dept: PRIMARY CARE CLINIC | Facility: CLINIC | Age: 58
End: 2023-02-06
Payer: COMMERCIAL

## 2023-02-06 DIAGNOSIS — K21.9 GERD WITHOUT ESOPHAGITIS: ICD-10-CM

## 2023-02-06 DIAGNOSIS — Z03.89 CORONARY ARTERY DISEASE EXCLUDED: Primary | ICD-10-CM

## 2023-02-06 DIAGNOSIS — Z00.00 ANNUAL PHYSICAL EXAM: ICD-10-CM

## 2023-02-06 NOTE — TELEPHONE ENCOUNTER
----- Message from Elvira Enamorado sent at 2/6/2023 10:03 AM CST -----  Contact: Patient @ PORTAL MESSAGE  type: Lab    Caller is requesting to schedule their Lab appointment prior to annual appointment.  Order is not listed in EPIC.  Please enter order and contact patient to schedule.    Name of Caller:Patient    Preferred Date and Time of Labs:03/29/2023 @ 7 AM     Date of Annual Physical Appointment:04/05/2023    Where would they like the lab performed?[facility]    Would the patient rather a call back or a response via [facility]? PORTAL MESSAGE     Best Call Back Number:    Additional Information:

## 2023-03-21 ENCOUNTER — TELEPHONE (OUTPATIENT)
Dept: PRIMARY CARE CLINIC | Facility: CLINIC | Age: 58
End: 2023-03-21
Payer: COMMERCIAL

## 2023-03-21 NOTE — TELEPHONE ENCOUNTER
----- Message from Cris Perez sent at 3/21/2023  9:14 AM CDT -----  Contact: [phone]  Pt is needing orders for her labs to be sent to [facility] ([facility] at ). Please call when it has been sent. Pt has an annual scheduled for 04/05.               Thank you

## 2023-03-24 LAB
ALBUMIN: 4.9 G/DL (ref 3.5–5)
ALP SERPL-CCNC: 62 U/L (ref 38–126)
ALT SERPL W P-5'-P-CCNC: 17 U/L (ref 7–56)
ANION GAP SERPL CALC-SCNC: 12.4 MMOL/L (ref 9–18)
AST SERPL-CCNC: 20 U/L (ref 7–40)
BASOPHILS ABSOLUTE COUNT: 0 THOUSAND/UL (ref 0–0.2)
BASOPHILS NFR BLD: 0.5 % (ref 0–2)
BILIRUB SERPL-MCNC: 0.4 MG/DL (ref 0–1.2)
BUN BLD-MCNC: 13 MG/DL (ref 7–21)
BUN/CREAT SERPL: 22 (ref 6–22)
CALC OSMOLALITY: 281 MOSM/KG (ref 275–295)
CALCIUM SERPL-MCNC: 9.8 MG/DL (ref 8.5–10.4)
CHLORIDE SERPL-SCNC: 103 MMOL/L (ref 98–107)
CHOL/HDLC RATIO: 2.54
CHOLEST SERPL-MSCNC: 170 MG/DL (ref 100–200)
CO2 SERPL-SCNC: 30 MMOL/L (ref 21–31)
CREAT SERPL-MCNC: 0.58 MG/DL (ref 0.5–1)
EGFR: 107 ML/MIN
EOSINOPHIL NFR BLD: 4.1 % (ref 0–4)
EOSINOPHILS ABSOLUTE COUNT: 0.2 THOUSAND/UL (ref 0–0.7)
ERYTHROCYTE [DISTWIDTH] IN BLOOD BY AUTOMATED COUNT: 12.5 % (ref 12–15.3)
FREE T4: 1.03 NG/DL (ref 0.9–1.7)
GLUCOSE SERPL-MCNC: 96 MG/DL (ref 70–100)
HCT VFR BLD AUTO: 42 % (ref 37–47)
HDLC SERPL-MCNC: 67 MG/DL (ref 40–75)
HGB BLD-MCNC: 13.9 GM/DL (ref 12–16)
LDLC SERPL CALC-MCNC: 93 MG/DL (ref 0–125)
LYMPHOCYTES %: 49.4 % (ref 15–45)
LYMPHOCYTES ABSOLUTE COUNT: 2 THOUSAND/UL (ref 1–4.2)
MCH RBC QN AUTO: 32.7 PG (ref 27–33)
MCHC RBC AUTO-ENTMCNC: 33 G/DL (ref 32–36)
MCV RBC AUTO: 99 FL (ref 81–99)
MONOCYTES %: 10.1 % (ref 3–13)
MONOCYTES ABSOLUTE COUNT: 0.4 THOUSAND/UL (ref 0.1–0.8)
NEUTROPHILS ABSOLUTE COUNT: 1.5 THOUSAND/UL (ref 2.1–7.6)
NEUTROPHILS RELATIVE PERCENT: 35.9 % (ref 32–80)
PLATELET # BLD AUTO: 294 THOUSAND/UL (ref 150–350)
PMV BLD AUTO: 8 FL (ref 7–10.2)
POTASSIUM SERPL-SCNC: 4.4 MMOL/L (ref 3.5–5)
RBC # BLD AUTO: 4.24 MILLION/UL (ref 4.2–5.4)
SODIUM BLD-SCNC: 141 MMOL/L (ref 135–145)
TOTAL PROTEIN: 6.9 G/DL (ref 6.3–8.2)
TRIGL SERPL-MCNC: 63 MG/DL (ref 30–150)
TSH SERPL DL<=0.005 MIU/L-ACNC: 1.56 UIU/ML (ref 0.35–4)
WBC # BLD AUTO: 4.1 THOUSAND/UL (ref 4.5–11)

## 2023-04-05 ENCOUNTER — OFFICE VISIT (OUTPATIENT)
Dept: PRIMARY CARE CLINIC | Facility: CLINIC | Age: 58
End: 2023-04-05
Payer: COMMERCIAL

## 2023-04-05 VITALS
HEART RATE: 53 BPM | SYSTOLIC BLOOD PRESSURE: 110 MMHG | OXYGEN SATURATION: 98 % | BODY MASS INDEX: 23.86 KG/M2 | DIASTOLIC BLOOD PRESSURE: 60 MMHG | HEIGHT: 63 IN | WEIGHT: 134.69 LBS

## 2023-04-05 DIAGNOSIS — D50.9 IRON DEFICIENCY ANEMIA, UNSPECIFIED IRON DEFICIENCY ANEMIA TYPE: ICD-10-CM

## 2023-04-05 DIAGNOSIS — M81.0 AGE-RELATED OSTEOPOROSIS WITHOUT CURRENT PATHOLOGICAL FRACTURE: ICD-10-CM

## 2023-04-05 DIAGNOSIS — K21.9 GERD WITHOUT ESOPHAGITIS: ICD-10-CM

## 2023-04-05 DIAGNOSIS — F41.9 ANXIETY: ICD-10-CM

## 2023-04-05 DIAGNOSIS — Z86.79 HISTORY OF CARDIOMYOPATHY: ICD-10-CM

## 2023-04-05 DIAGNOSIS — Z00.00 ANNUAL PHYSICAL EXAM: Primary | ICD-10-CM

## 2023-04-05 DIAGNOSIS — Z12.31 SCREENING MAMMOGRAM, ENCOUNTER FOR: ICD-10-CM

## 2023-04-05 DIAGNOSIS — R00.2 PALPITATIONS: ICD-10-CM

## 2023-04-05 PROCEDURE — 3074F SYST BP LT 130 MM HG: CPT | Mod: CPTII,S$GLB,, | Performed by: INTERNAL MEDICINE

## 2023-04-05 PROCEDURE — 3074F PR MOST RECENT SYSTOLIC BLOOD PRESSURE < 130 MM HG: ICD-10-PCS | Mod: CPTII,S$GLB,, | Performed by: INTERNAL MEDICINE

## 2023-04-05 PROCEDURE — 4010F ACE/ARB THERAPY RXD/TAKEN: CPT | Mod: CPTII,S$GLB,, | Performed by: INTERNAL MEDICINE

## 2023-04-05 PROCEDURE — 99999 PR PBB SHADOW E&M-EST. PATIENT-LVL IV: ICD-10-PCS | Mod: PBBFAC,,, | Performed by: INTERNAL MEDICINE

## 2023-04-05 PROCEDURE — 3078F PR MOST RECENT DIASTOLIC BLOOD PRESSURE < 80 MM HG: ICD-10-PCS | Mod: CPTII,S$GLB,, | Performed by: INTERNAL MEDICINE

## 2023-04-05 PROCEDURE — 99999 PR PBB SHADOW E&M-EST. PATIENT-LVL IV: CPT | Mod: PBBFAC,,, | Performed by: INTERNAL MEDICINE

## 2023-04-05 PROCEDURE — 1159F PR MEDICATION LIST DOCUMENTED IN MEDICAL RECORD: ICD-10-PCS | Mod: CPTII,S$GLB,, | Performed by: INTERNAL MEDICINE

## 2023-04-05 PROCEDURE — 99396 PREV VISIT EST AGE 40-64: CPT | Mod: S$GLB,,, | Performed by: INTERNAL MEDICINE

## 2023-04-05 PROCEDURE — 1159F MED LIST DOCD IN RCRD: CPT | Mod: CPTII,S$GLB,, | Performed by: INTERNAL MEDICINE

## 2023-04-05 PROCEDURE — 3078F DIAST BP <80 MM HG: CPT | Mod: CPTII,S$GLB,, | Performed by: INTERNAL MEDICINE

## 2023-04-05 PROCEDURE — 4010F PR ACE/ARB THEARPY RXD/TAKEN: ICD-10-PCS | Mod: CPTII,S$GLB,, | Performed by: INTERNAL MEDICINE

## 2023-04-05 PROCEDURE — 3008F PR BODY MASS INDEX (BMI) DOCUMENTED: ICD-10-PCS | Mod: CPTII,S$GLB,, | Performed by: INTERNAL MEDICINE

## 2023-04-05 PROCEDURE — 3008F BODY MASS INDEX DOCD: CPT | Mod: CPTII,S$GLB,, | Performed by: INTERNAL MEDICINE

## 2023-04-05 PROCEDURE — 99396 PR PREVENTIVE VISIT,EST,40-64: ICD-10-PCS | Mod: S$GLB,,, | Performed by: INTERNAL MEDICINE

## 2023-04-05 RX ORDER — BISOPROLOL FUMARATE 5 MG/1
TABLET, FILM COATED ORAL
COMMUNITY
Start: 2022-10-12

## 2023-04-05 RX ORDER — SACUBITRIL AND VALSARTAN 24; 26 MG/1; MG/1
TABLET, FILM COATED ORAL
COMMUNITY
Start: 2023-03-31

## 2023-04-05 RX ORDER — ESCITALOPRAM OXALATE 10 MG/1
10 TABLET ORAL DAILY
Qty: 90 TABLET | Refills: 3 | Status: SHIPPED | OUTPATIENT
Start: 2023-04-05 | End: 2024-02-07

## 2023-04-05 RX ORDER — CELECOXIB 200 MG/1
CAPSULE ORAL
COMMUNITY
Start: 2023-01-11

## 2023-04-05 NOTE — PROGRESS NOTES
[facility] Note    Chief Complaint      Chief Complaint   Patient presents with    Annual Exam       History of Present Illness      Melina Jean is a 57 y.o. female with chronic conditions of MIQUEL, GERD, anxiety, chronic neck and back pain who presents today for: annual preventative visit.  Takatsubo's cardiomyopathy, Chronic palpitations: Sees Dr. Aiken, cardiology.  Recent echo showed normal EF.  On entresto and bisoprolol.    GERD: Controlled on famotidine daily.  Had EGD last year with Dr. Cerrato.    Osteopenia: Sees Dr. Blanc.    Diet: Prepares own food mostly.  Limiting fatty foods and carbs.  Drinks plenty water.    Exercise: stays active.  Elliptical and walks.    Denies drinking and driving, drinking more than 4 drinks on occasion, drug use.     Flu shot missed last season.  Td 2019.  Shingrix UTD.  COVID vaccine UTD.  Pneumonia vaccine due age 67.  Mammogram 2022.  Sees Dr. Feng.  DEXA UTD 2018.   Cscope Dr. Cerrato, 2019, no polyps, 5 yr interval for family history of colon cancer in mother.     Past Medical History:  Past Medical History:   Diagnosis Date    GERD (gastroesophageal reflux disease)     Osteoporosis        Past Surgical History:   has a past surgical history that includes Cholecystectomy and Tonsillectomy.    Family History:  family history includes COPD in her father and mother; Cancer in her mother; Colon cancer in her mother; No Known Problems in her brother.     Social History:  Social History     Tobacco Use    Smoking status: Never    Smokeless tobacco: Never   Substance Use Topics    Alcohol use: Yes     Alcohol/week: 4.0 standard drinks     Types: 1 Glasses of wine, 3 Cans of beer per week     Comment: Socially not every week    Drug use: Never       I personally reviewed all past medical, surgical, social and family history.    Review of Systems   Constitutional:  Negative for chills, fever and malaise/fatigue.   Respiratory:  Negative for shortness  of breath.    Cardiovascular:  Negative for chest pain.   Gastrointestinal:  Negative for constipation, diarrhea, nausea and vomiting.   Skin:  Negative for rash.   Neurological:  Negative for weakness.   All other systems reviewed and are negative.     Medications:  Outpatient Encounter Medications as of 4/5/2023   Medication Sig Dispense Refill    alendronate (FOSAMAX) 70 MG tablet TAKE ONE TABLET BY MOUTH ONCE WEEKLY ON EMPTY STOMACH. DO NOT RECLINE FOR 60 MINUTES AFTER 12 tablet 3    ascorbic acid, vitamin C, 500 mg Chew Take by mouth Daily.      biotin 10,000 mcg Cap Take 1 capsule by mouth once daily.      bisoprolol (ZEBETA) 5 MG tablet       celecoxib (CELEBREX) 200 MG capsule       cyanocobalamin-cobamamide (B12) 5,000-100 mcg Lozg Take by mouth Daily.      ENTRESTO 24-26 mg per tablet       EScitalopram oxalate (LEXAPRO) 10 MG tablet Take 1 tablet (10 mg total) by mouth once daily. 90 tablet 3    famotidine (PEPCID) 20 MG tablet Take 20 mg by mouth Daily.      magnesium 200 mg Tab Take 400 mg by mouth Daily.      zinc gluconate 50 mg tablet Take 50 mg by mouth once daily.      [DISCONTINUED] alendronate (FOSAMAX) 70 MG tablet TAKE ONE TABLET ONCE A WEEK ON AN EMPTY STOMACH. DO NOT RECLINE FOR 60 MINUTES AFTER 12 tablet 3    [DISCONTINUED] COLLAGEN MISC 100 mcg by Misc.(Non-Drug; Combo Route) route once daily at 6am.      [DISCONTINUED] EScitalopram oxalate (LEXAPRO) 10 MG tablet Take 1 tablet (10 mg total) by mouth once daily. 30 tablet 11    [DISCONTINUED] eszopiclone (LUNESTA) 3 mg Tab Take 3 mg by mouth nightly as needed.      [DISCONTINUED] fluticasone propionate (FLONASE) 50 mcg/actuation nasal spray 2 sprays by Each Nostril route as needed.       No facility-administered encounter medications on file as of 4/5/2023.       Allergies:  Review of patient's allergies indicates:  No Known Allergies    Health Maintenance:  Immunization History   Administered Date(s) Administered    COVID-19, vector-nr,  "rS-Ad26, PF (Cristy) 04/01/2021    Influenza 10/15/2021    Influenza - Quadrivalent 10/03/2017, 10/19/2018, 11/19/2021    Influenza - Quadrivalent - MDCK - PF 10/17/2020    Influenza - Quadrivalent - PF *Preferred* (6 months and older) 09/24/2019    Tdap 11/22/2019    Zoster 09/25/2018, 11/28/2018    Zoster Recombinant 09/27/2018, 11/29/2018      Health Maintenance   Topic Date Due    High Dose Statin  Never done    Mammogram  04/27/2023    Lipid Panel  03/24/2028    TETANUS VACCINE  11/22/2029    Hepatitis C Screening  Completed        Physical Exam      Vital Signs  Pulse: (!) 53  SpO2: 98 %  BP: 110/60  BP Location: Right arm  Patient Position: Sitting  Pain Score: 0-No pain  Height and Weight  Height: 5' 3" (160 cm)  Weight: 61.1 kg (134 lb 11.2 oz)  BSA (Calculated - sq m): 1.65 sq meters  BMI (Calculated): 23.9  Weight in (lb) to have BMI = 25: 140.8]    Physical Exam  Vitals reviewed.   Constitutional:       Appearance: She is well-developed.   HENT:      Head: Normocephalic and atraumatic.      Right Ear: External ear normal.      Left Ear: External ear normal.   Cardiovascular:      Rate and Rhythm: Normal rate and regular rhythm.      Heart sounds: Normal heart sounds. No murmur heard.  Pulmonary:      Effort: Pulmonary effort is normal.      Breath sounds: Normal breath sounds. No wheezing or rales.   Abdominal:      General: Bowel sounds are normal. There is no distension.      Palpations: Abdomen is soft.      Tenderness: There is no abdominal tenderness.        Laboratory:  CBC:  Recent Labs   Lab 04/27/21  0758 03/24/23  0857   WBC 4.1 4.1 L   RBC 4.32 4.24   Hemoglobin 13.8 13.9   Hematocrit 41.4 42.0   Platelets 313 294   MCV 95.8 99.0   MCH 31.9 32.7   MCHC 33.3 33.0     CMP:  Recent Labs   Lab 04/27/21  0758 05/01/21  0950 03/24/23  0857   Glucose 86   < > 96   Calcium 9.9   < > 9.8   Albumin 4.4  --   --    ALBUMIN  --    < > 4.9   Total Protein 6.7   < > 6.9   Sodium 143   < > 141   Potassium " 4.2   < > 4.4   CO2 28   < > 30   Chloride 106   < > 103   BUN 12   < > 13.0   Alkaline Phosphatase  --   --  62   ALT 11  --  17   AST 13  --  20   Total Bilirubin 0.5  --  0.4    < > = values in this interval not displayed.     URINALYSIS:       LIPIDS:  Recent Labs   Lab 04/27/21  0758 06/04/22  1004 03/24/23  0857   TSH 1.73 1.20 1.56   HDL 62  --  67   Cholesterol 173  --  170   Triglycerides 116  --  63   LDL Calculated  --   --  93   LDL Cholesterol 90  --   --    HDL/Cholesterol Ratio 2.8  --   --    Non HDL Chol. (LDL+VLDL) 111  --   --      TSH:  Recent Labs   Lab 04/27/21  0758 06/04/22  1004 03/24/23  0857   TSH 1.73 1.20 1.56     A1C:        Assessment/Plan     Melina Jean is a 57 y.o.female with:    1. Annual physical exam  2. Screening mammogram, encounter for  - Mammo Digital Screening Bilat w/ [facility]le; Future  - Mammo Digital Screening Bilat w/ Earle  Discussed diet and exercise, vaccines and cancer screening, risk factors.  Screening labs ordered.     3. History of cardiomyopathy  4. Palpitations  Continue current meds.  F/U with cardiology  5. Anxiety  - EScitalopram oxalate (LEXAPRO) 10 MG tablet; Take 1 tablet (10 mg total) by mouth once daily.  Dispense: 90 tablet; Refill: 3  Continue current meds.    6. Iron deficiency anemia, unspecified iron deficiency anemia type  Stable.  Reviewed labs  7. GERD without esophagitis  Continue current meds.    8. Age-related osteoporosis without current pathological fracture  Continue current meds.      Chronic conditions status updated as per HPI.  Other than changes above, cont current medications and maintain follow up with specialists.  No follow-ups on file.    Future Appointments   Date Time Provider Department Center   6/23/2023 10:00 AM Brittany Feng MD [facility]   6/26/2023  2:30 PM Karan Blanc MD [facility]       Eliud Gerard MD  [facility]

## 2023-05-05 ENCOUNTER — E-VISIT (OUTPATIENT)
Dept: PRIMARY CARE CLINIC | Facility: CLINIC | Age: 58
End: 2023-05-05
Payer: COMMERCIAL

## 2023-05-05 ENCOUNTER — TELEPHONE (OUTPATIENT)
Dept: PRIMARY CARE CLINIC | Facility: CLINIC | Age: 58
End: 2023-05-05
Payer: COMMERCIAL

## 2023-05-05 DIAGNOSIS — R30.0 DYSURIA: Primary | ICD-10-CM

## 2023-05-05 DIAGNOSIS — N39.0 URINARY TRACT INFECTION WITHOUT HEMATURIA, SITE UNSPECIFIED: Primary | ICD-10-CM

## 2023-05-05 PROCEDURE — 99421 OL DIG E/M SVC 5-10 MIN: CPT | Mod: 95,,, | Performed by: INTERNAL MEDICINE

## 2023-05-05 PROCEDURE — 99421 PR E&M, ONLINE DIGIT, EST, < 7 DAYS, 5-10 MINS: ICD-10-PCS | Mod: 95,,, | Performed by: INTERNAL MEDICINE

## 2023-05-05 RX ORDER — SULFAMETHOXAZOLE AND TRIMETHOPRIM 800; 160 MG/1; MG/1
1 TABLET ORAL 2 TIMES DAILY
Qty: 6 TABLET | Refills: 0 | Status: SHIPPED | OUTPATIENT
Start: 2023-05-05 | End: 2023-06-26

## 2023-05-05 NOTE — TELEPHONE ENCOUNTER
----- Message from Cris Perez sent at 5/5/2023  8:20 AM CDT -----  Contact: [phone]  Pt is having blood in her urine and burning . Pt says she has a possible UTI. She would like to do a Urine Sample on today to verify. Per pt, she has some events this weekend and would really like this taken care of today. Please call. Pt is using   [facility] - [place] - [id number]   [address] [facility] [place]  [place] [address]  Phone: [phone] Fax: [phone]                Thank you

## 2023-05-05 NOTE — PROGRESS NOTES
Patient ID: Melina Jean is a 57 y.o. female.    Chief Complaint: Urinary Tract Infection    The patient initiated a request through [employer] on 5/5/2023 for evaluation and management with a chief complaint of Urinary Tract Infection     I evaluated the questionnaire submission on 5/5/2023.    [facility] Evisit Uti Questionnaire    5/5/2023  7:35 AM CDT - Filed by Patient   Do you agree to participate in an E-Visit? Yes   If you have any of the following problems, please present to your local ER or call 911:  I acknowledge   What is the main issue that you would like for your doctor to address today? Feeling urge to go to bathroom   Are you able to take your vital signs? Yes   Systolic Blood Pressure:    Diastolic Blood Pressure:    Weight: 133   Height: 63   Pulse: 65   Temperature:    Respiration rate:    Pulse Oxygen:    Are you currently pregnant, could you be pregnant, or are you breast feeding? None of the above   What symptoms do you currently have? Difficulty passing urine   When did your symptoms first appear? 5/5/2023   List what you have done or taken to help your symptoms. Nothing   Please indicate whether you have had the following symptoms during the past 24 hours     Urgent urination (a sudden and uncontrollable urge to urinate) Mild   Frequent urination of small amounts of urine (going to the toilet very often) Mild   Burning pain when urinating None   Incomplete bladder emptying (still feel like you need to urinate again after urination) Mild   Pain not associated with urination in the lower abdomen below the belly button) None   What does your urine look like? I am not sure   Blood seen in the urine None   Flank pain (pain in one or both sides of the lower back) None   Abnormal Vaginal Discharge (abnormal amount, color and/or odor) None   Discharge from the urethra (urinary opening) without urination None   High body temperature/fever? None-<99.5   Please rate how much discomfort you have  experience because of the symptoms in the past 24 hours: Moderate   Please indicate how the symptoms have interfered with your every day activities/work in the past 24 hours: None   Please indicate how these symptoms have interfered with your social activities (visiting people, meeting with friends, etc.) in the past 24 hours? None   Are you a diabetic? No   Please indicate whether you have the following at the time of completion of this questionnaire: None of the above   Provide any information you feel is important to your history not asked above    Please attach any relevant images or files (if you have performed a home test for UTI, please submit a photo of results)          Recent Labs Obtained:  No visits with results within 7 Day(s) from this visit.   Latest known visit with results is:   Telephone on 02/06/2023   Component Date Value Ref Range Status    Free T4 03/24/2023 1.03  0.90 - 1.70 ng/dL Final    Specimens containing rheumatoid factor may show elevated values when measuring Free Thyroxine (FT4).    TSH 03/24/2023 1.56  0.35 - 4.00 uIU/mL Final    Cholesterol 03/24/2023 170  100 - 200 mg/dL Final    Triglycerides 03/24/2023 63  30 - 150 mg/dL Final    HDL 03/24/2023 67  40 - 75 mg/dL Final    LDL Calculated 03/24/2023 93  0 - 125 mg/dL Final    CHOL/HDLC Ratio 03/24/2023 2.54   Final    Glucose 03/24/2023 96  70 - 100 mg/dL Final    BUN 03/24/2023 13.0  7.0 - 21.0 mg/dL Final    Creatinine 03/24/2023 0.58  0.50 - 1.00 mg/dL Final    BUN/Creatinine Ratio 03/24/2023 22  6 - 22 Final    Sodium 03/24/2023 141  135 - 145 mmol/L Final    Potassium 03/24/2023 4.4  3.5 - 5.0 mmol/L Final    Chloride 03/24/2023 103  98 - 107 mmol/L Final    CO2 03/24/2023 30  21 - 31 mmol/L Final    Comment: Possible interference observed for Total Bilirubin with immunoglobulin G (IgG) with concentrations a  (187 umol/L).      Calcium 03/24/2023 9.8  8.5 - 10.4 mg/dL Final    Total Protein 03/24/2023 6.9  6.3 - 8.2 g/dL Final     ALBUMIN 03/24/2023 4.9  3.5 - 5.0 g/dL Final    Total Bilirubin 03/24/2023 0.4  0.0 - 1.2 mg/dL Final    Alkaline Phosphatase 03/24/2023 62  38 - 126 U/L Final    AST 03/24/2023 20  7 - 40 U/L Final    ALT 03/24/2023 17  7 - 56 U/L Final    Calc Osmolality 03/24/2023 281  275 - 295 mOsm/kg Final    Anion Gap 03/24/2023 12.4  9 - 18 mmol/L Final    eGFR 03/24/2023 107  >=90 mL/min Final    Comment: Calculation based on the Chronic Kidney Disease Epidemiology Collaboration (CKD-EPI) equation refit  without adjustment for race.      WBC 03/24/2023 4.1 (L)  4.5 - 11.0 Thousand/uL Final    RBC 03/24/2023 4.24  4.20 - 5.40 Million/uL Final    Hemoglobin 03/24/2023 13.9  12.0 - 16.0 gm/dL Final    Hematocrit 03/24/2023 42.0  37.0 - 47.0 % Final    MCV 03/24/2023 99.0  81.0 - 99.0 fL Final    MCH 03/24/2023 32.7  27.0 - 33.0 pg Final    MCHC 03/24/2023 33.0  32.0 - 36.0 g/dL Final    RDW 03/24/2023 12.5  12.0 - 15.3 % Final    Platelets 03/24/2023 294  150 - 350 Thousand/uL Final    MPV 03/24/2023 8.0  7.0 - 10.2 fL Final    Neutrophils Relative 03/24/2023 35.9  32 - 80 % Final    Lymphocytes % 03/24/2023 49.4 (H)  15 - 45 % Final    Monocytes % 03/24/2023 10.1  3 - 13 % Final    Eosinophil % 03/24/2023 4.1 (H)  0 - 4 % Final    Basophil % 03/24/2023 0.5  0 - 2 % Final    Neutrophils, Abs 03/24/2023 1.50 (L)  2.10 - 7.60 Thousand/uL Final    Lymphocytes Absolute 03/24/2023 2.00  1.00 - 4.20 Thousand/uL Final    Monocytes Absolute Count 03/24/2023 0.40  0.10 - 0.80 Thousand/uL Final    Eosinophils Absolute 03/24/2023 0.20  0.00 - 0.70 Thousand/uL Final    Basophils Absolute 03/24/2023 0.00  0.00 - 0.20 Thousand/uL Final       Encounter Diagnosis   Name Primary?    Urinary tract infection without hematuria, site unspecified Yes      Sent in orders for UA with culture.        No follow-ups on file.      E-Visit Time Tracking:    Day 1 Time (in minutes): 5     Total Time (in minutes): 5

## 2023-05-08 LAB
APPEARANCE UR: ABNORMAL
BACTERIA #/AREA URNS HPF: ABNORMAL /HPF
BACTERIA UR CULT: ABNORMAL
BILIRUB UR QL STRIP: NEGATIVE
COLOR UR: ABNORMAL
GLUCOSE UR QL STRIP: NEGATIVE
HGB UR QL STRIP: ABNORMAL
HYALINE CASTS #/AREA URNS LPF: ABNORMAL /LPF
KETONES UR QL STRIP: NEGATIVE
LEUKOCYTE ESTERASE UR QL STRIP: ABNORMAL
NITRITE UR QL STRIP: POSITIVE
PH UR STRIP: 7.5 [PH] (ref 5–8)
PROT UR QL STRIP: ABNORMAL
RBC #/AREA URNS HPF: ABNORMAL /HPF
SERVICE CMNT-IMP: ABNORMAL
SP GR UR STRIP: 1.01 (ref 1–1.03)
SQUAMOUS #/AREA URNS HPF: ABNORMAL /HPF
WBC #/AREA URNS HPF: ABNORMAL /HPF

## 2023-06-23 ENCOUNTER — OFFICE VISIT (OUTPATIENT)
Dept: OBSTETRICS AND GYNECOLOGY | Facility: CLINIC | Age: 58
End: 2023-06-23
Attending: STUDENT IN AN ORGANIZED HEALTH CARE EDUCATION/TRAINING PROGRAM
Payer: COMMERCIAL

## 2023-06-23 VITALS
WEIGHT: 136.44 LBS | HEIGHT: 63 IN | DIASTOLIC BLOOD PRESSURE: 68 MMHG | SYSTOLIC BLOOD PRESSURE: 92 MMHG | BODY MASS INDEX: 24.18 KG/M2

## 2023-06-23 DIAGNOSIS — Z01.419 WELL WOMAN EXAM: Primary | ICD-10-CM

## 2023-06-23 PROCEDURE — 3008F PR BODY MASS INDEX (BMI) DOCUMENTED: ICD-10-PCS | Mod: CPTII,S$GLB,, | Performed by: STUDENT IN AN ORGANIZED HEALTH CARE EDUCATION/TRAINING PROGRAM

## 2023-06-23 PROCEDURE — 99396 PREV VISIT EST AGE 40-64: CPT | Mod: S$GLB,,, | Performed by: STUDENT IN AN ORGANIZED HEALTH CARE EDUCATION/TRAINING PROGRAM

## 2023-06-23 PROCEDURE — 4010F PR ACE/ARB THEARPY RXD/TAKEN: ICD-10-PCS | Mod: CPTII,S$GLB,, | Performed by: STUDENT IN AN ORGANIZED HEALTH CARE EDUCATION/TRAINING PROGRAM

## 2023-06-23 PROCEDURE — 1159F PR MEDICATION LIST DOCUMENTED IN MEDICAL RECORD: ICD-10-PCS | Mod: CPTII,S$GLB,, | Performed by: STUDENT IN AN ORGANIZED HEALTH CARE EDUCATION/TRAINING PROGRAM

## 2023-06-23 PROCEDURE — 3074F PR MOST RECENT SYSTOLIC BLOOD PRESSURE < 130 MM HG: ICD-10-PCS | Mod: CPTII,S$GLB,, | Performed by: STUDENT IN AN ORGANIZED HEALTH CARE EDUCATION/TRAINING PROGRAM

## 2023-06-23 PROCEDURE — 1159F MED LIST DOCD IN RCRD: CPT | Mod: CPTII,S$GLB,, | Performed by: STUDENT IN AN ORGANIZED HEALTH CARE EDUCATION/TRAINING PROGRAM

## 2023-06-23 PROCEDURE — 4010F ACE/ARB THERAPY RXD/TAKEN: CPT | Mod: CPTII,S$GLB,, | Performed by: STUDENT IN AN ORGANIZED HEALTH CARE EDUCATION/TRAINING PROGRAM

## 2023-06-23 PROCEDURE — 99999 PR PBB SHADOW E&M-EST. PATIENT-LVL III: ICD-10-PCS | Mod: PBBFAC,,, | Performed by: STUDENT IN AN ORGANIZED HEALTH CARE EDUCATION/TRAINING PROGRAM

## 2023-06-23 PROCEDURE — 99396 PR PREVENTIVE VISIT,EST,40-64: ICD-10-PCS | Mod: S$GLB,,, | Performed by: STUDENT IN AN ORGANIZED HEALTH CARE EDUCATION/TRAINING PROGRAM

## 2023-06-23 PROCEDURE — 3008F BODY MASS INDEX DOCD: CPT | Mod: CPTII,S$GLB,, | Performed by: STUDENT IN AN ORGANIZED HEALTH CARE EDUCATION/TRAINING PROGRAM

## 2023-06-23 PROCEDURE — 3074F SYST BP LT 130 MM HG: CPT | Mod: CPTII,S$GLB,, | Performed by: STUDENT IN AN ORGANIZED HEALTH CARE EDUCATION/TRAINING PROGRAM

## 2023-06-23 PROCEDURE — 3078F DIAST BP <80 MM HG: CPT | Mod: CPTII,S$GLB,, | Performed by: STUDENT IN AN ORGANIZED HEALTH CARE EDUCATION/TRAINING PROGRAM

## 2023-06-23 PROCEDURE — 3078F PR MOST RECENT DIASTOLIC BLOOD PRESSURE < 80 MM HG: ICD-10-PCS | Mod: CPTII,S$GLB,, | Performed by: STUDENT IN AN ORGANIZED HEALTH CARE EDUCATION/TRAINING PROGRAM

## 2023-06-23 PROCEDURE — 99999 PR PBB SHADOW E&M-EST. PATIENT-LVL III: CPT | Mod: PBBFAC,,, | Performed by: STUDENT IN AN ORGANIZED HEALTH CARE EDUCATION/TRAINING PROGRAM

## 2023-06-23 NOTE — PROGRESS NOTES
Chief Complaint: Well Woman Exam     HPI:      Melina Jean is a 58 y.o.  who presents today for well woman exam.  LMP: No LMP recorded (lmp unknown). Patient is perimenopausal.  Does report some vaginal dryness.  No other issues, problems, or complaints. Specifically, patient denies vaginal bleeding, discharge, pelvic pain, urinary problems, or changes in appetite.   She denies hot flushes, vaginal atrophy, mood changes.  Ms. Jean is currently sexually active with a single male partner.      Recently had MI.  Doing better.   Works as .    Previous Pap:  2021 NEM, HPV neg  Previous Mammogram:  negative   Most Recent Dexa:  Osteopenia and osteoporosis.  Sees Endocrine.  Colonoscopy:  Normal per patient    OB History          2    Para   2    Term   2            AB        Living   2         SAB        IAB        Ectopic        Multiple        Live Births   2           Obstetric Comments   Menarche age 15             Past Medical History:   Diagnosis Date    Broken heart syndrome 2022    GERD (gastroesophageal reflux disease)     Osteoporosis      Past Surgical History:   Procedure Laterality Date    CHOLECYSTECTOMY      TONSILLECTOMY       Social History     Socioeconomic History    Marital status:    Tobacco Use    Smoking status: Never    Smokeless tobacco: Never   Substance and Sexual Activity    Alcohol use: Yes     Alcohol/week: 4.0 standard drinks     Types: 1 Glasses of wine, 3 Cans of beer per week     Comment: Socially not every week    Drug use: Never    Sexual activity: Yes     Partners: Male     Birth control/protection: None     Family History   Problem Relation Age of Onset    Colon cancer Mother     Cancer Mother         Colon    COPD Mother         Smoker    COPD Father          2021 due to Covid-19 (Afib)    No Known Problems Brother        Current Outpatient Medications:     alendronate (FOSAMAX) 70 MG tablet, TAKE ONE  "TABLET BY MOUTH ONCE WEEKLY ON EMPTY STOMACH. DO NOT RECLINE FOR 60 MINUTES AFTER, Disp: 12 tablet, Rfl: 3    ascorbic acid, vitamin C, 500 mg Chew, Take by mouth Daily., Disp: , Rfl:     bisoprolol (ZEBETA) 5 MG tablet, , Disp: , Rfl:     celecoxib (CELEBREX) 200 MG capsule, , Disp: , Rfl:     ENTRESTO 24-26 mg per tablet, , Disp: , Rfl:     EScitalopram oxalate (LEXAPRO) 10 MG tablet, Take 1 tablet (10 mg total) by mouth once daily., Disp: 90 tablet, Rfl: 3    famotidine (PEPCID) 20 MG tablet, Take 20 mg by mouth Daily., Disp: , Rfl:     zinc gluconate 50 mg tablet, Take 50 mg by mouth once daily., Disp: , Rfl:     magnesium 200 mg Tab, Take 400 mg by mouth Daily., Disp: , Rfl:     sulfamethoxazole-trimethoprim 800-160mg (BACTRIM DS) 800-160 mg Tab, Take 1 tablet by mouth 2 (two) times daily. (Patient not taking: Reported on 6/23/2023), Disp: 6 tablet, Rfl: 0    ROS:     GENERAL: Denies unintentional weight gain or weight loss. Feeling well overall.   SKIN: Denies rash or lesions.   HEENT: Denies headaches, or vision changes.   CARDIOVASCULAR: Denies palpitations or chest pain.   RESPIRATORY: Denies shortness of breath or dyspnea on exertion.  BREASTS: Denies pain, lumps, or nipple discharge.   ABDOMEN: Denies abdominal pain, constipation, diarrhea, nausea, vomiting, change in appetite.  URINARY: Denies frequency, dysuria, hematuria.  NEUROLOGIC: Denies syncope or weakness.   PSYCHIATRIC: Denies depression, anxiety or mood swings.    Physical Exam:      PHYSICAL EXAM:  BP 92/68   Ht 5' 3" (1.6 m)   Wt 61.9 kg (136 lb 7.4 oz)   LMP  (LMP Unknown)   BMI 24.17 kg/m²   Body mass index is 24.17 kg/m².     APPEARANCE: Well nourished, well developed, in no acute distress.  PSYCH: Appropriate mood and affect.  SKIN: No acne or hirsutism.  NECK: Neck symmetric without masses or thyromegaly.  NODES: No inguinal, axillary, or supraclavicular lymph node enlargement.  CHEST: Normal respiratory effort.    CARDIOVASCULAR:  " Regular rate and rhythm.  LUNGS:  Clear to auscultation bilaterally.  BREASTS: Symmetrical, no skin changes or visible lesions.  No palpable masses or nipple discharge bilaterally.  ABDOMEN: Soft.  No tenderness or masses.    PELVIC: Normal external genitalia without lesions.  Normal hair distribution.  Adequate perineal body, normal urethral meatus.  Vagina moist and well rugated without lesions or discharge.  Cervix pink, without lesions, discharge or tenderness.  No significant cystocele or rectocele.  Bimanual exam shows uterus to be normal size, regular, mobile and nontender.  Adnexa without masses or tenderness.    EXTREMITIES: No edema.  No tenderness to palpation.      Assessment/Plan:     58 y.o.     Well woman exam            Counselin.  Annual exam performed today without difficulty.  Patient was counseled today on current ASCCP pap guidelines, the recommendation for yearly pelvic exams, healthy diet and exercise routines, annual mammograms.  Mammogram utd.  Pap smear utd.  All questions answered.  Bone density follow up with endocrine  2.  Follow up with PCP for other health maintenance.  3.  Follow up in about 1 year (around 2024).     Use of the [facility] Patient Portal discussed and encouraged during today's visit.

## 2023-06-26 ENCOUNTER — OFFICE VISIT (OUTPATIENT)
Dept: ENDOCRINOLOGY | Facility: CLINIC | Age: 58
End: 2023-06-26
Payer: COMMERCIAL

## 2023-06-26 VITALS
OXYGEN SATURATION: 98 % | SYSTOLIC BLOOD PRESSURE: 90 MMHG | BODY MASS INDEX: 24.74 KG/M2 | HEART RATE: 44 BPM | WEIGHT: 139.69 LBS | RESPIRATION RATE: 16 BRPM | DIASTOLIC BLOOD PRESSURE: 60 MMHG

## 2023-06-26 DIAGNOSIS — M81.0 AGE-RELATED OSTEOPOROSIS WITHOUT CURRENT PATHOLOGICAL FRACTURE: Primary | ICD-10-CM

## 2023-06-26 PROCEDURE — 1159F PR MEDICATION LIST DOCUMENTED IN MEDICAL RECORD: ICD-10-PCS | Mod: CPTII,S$GLB,, | Performed by: INTERNAL MEDICINE

## 2023-06-26 PROCEDURE — 4010F PR ACE/ARB THEARPY RXD/TAKEN: ICD-10-PCS | Mod: CPTII,S$GLB,, | Performed by: INTERNAL MEDICINE

## 2023-06-26 PROCEDURE — 3008F PR BODY MASS INDEX (BMI) DOCUMENTED: ICD-10-PCS | Mod: CPTII,S$GLB,, | Performed by: INTERNAL MEDICINE

## 2023-06-26 PROCEDURE — 3078F PR MOST RECENT DIASTOLIC BLOOD PRESSURE < 80 MM HG: ICD-10-PCS | Mod: CPTII,S$GLB,, | Performed by: INTERNAL MEDICINE

## 2023-06-26 PROCEDURE — 99214 PR OFFICE/OUTPT VISIT, EST, LEVL IV, 30-39 MIN: ICD-10-PCS | Mod: S$GLB,,, | Performed by: INTERNAL MEDICINE

## 2023-06-26 PROCEDURE — 3078F DIAST BP <80 MM HG: CPT | Mod: CPTII,S$GLB,, | Performed by: INTERNAL MEDICINE

## 2023-06-26 PROCEDURE — 1160F PR REVIEW ALL MEDS BY PRESCRIBER/CLIN PHARMACIST DOCUMENTED: ICD-10-PCS | Mod: CPTII,S$GLB,, | Performed by: INTERNAL MEDICINE

## 2023-06-26 PROCEDURE — 3074F PR MOST RECENT SYSTOLIC BLOOD PRESSURE < 130 MM HG: ICD-10-PCS | Mod: CPTII,S$GLB,, | Performed by: INTERNAL MEDICINE

## 2023-06-26 PROCEDURE — 4010F ACE/ARB THERAPY RXD/TAKEN: CPT | Mod: CPTII,S$GLB,, | Performed by: INTERNAL MEDICINE

## 2023-06-26 PROCEDURE — 1160F RVW MEDS BY RX/DR IN RCRD: CPT | Mod: CPTII,S$GLB,, | Performed by: INTERNAL MEDICINE

## 2023-06-26 PROCEDURE — 1159F MED LIST DOCD IN RCRD: CPT | Mod: CPTII,S$GLB,, | Performed by: INTERNAL MEDICINE

## 2023-06-26 PROCEDURE — 99999 PR PBB SHADOW E&M-EST. PATIENT-LVL IV: CPT | Mod: PBBFAC,,, | Performed by: INTERNAL MEDICINE

## 2023-06-26 PROCEDURE — 3074F SYST BP LT 130 MM HG: CPT | Mod: CPTII,S$GLB,, | Performed by: INTERNAL MEDICINE

## 2023-06-26 PROCEDURE — 99999 PR PBB SHADOW E&M-EST. PATIENT-LVL IV: ICD-10-PCS | Mod: PBBFAC,,, | Performed by: INTERNAL MEDICINE

## 2023-06-26 PROCEDURE — 3008F BODY MASS INDEX DOCD: CPT | Mod: CPTII,S$GLB,, | Performed by: INTERNAL MEDICINE

## 2023-06-26 PROCEDURE — 99214 OFFICE O/P EST MOD 30 MIN: CPT | Mod: S$GLB,,, | Performed by: INTERNAL MEDICINE

## 2023-06-26 RX ORDER — ALENDRONATE SODIUM 70 MG/1
TABLET ORAL
Qty: 12 TABLET | Refills: 3 | Status: SHIPPED | OUTPATIENT
Start: 2023-06-26 | End: 2024-03-01

## 2023-06-26 RX ORDER — CALCIUM CARBONATE 600 MG
600 TABLET ORAL ONCE
COMMUNITY

## 2023-06-26 NOTE — PATIENT INSTRUCTIONS
Please have bone density scan done at [facility] [address]    Have [facility] at [facility] when convenient (no hurry)    Send me a copy of your bone density scan results through the [employer] portal.

## 2023-06-26 NOTE — PROGRESS NOTES
ENDOCRINOLOGY CLINIC  FOLLOW UP  06/26/2023     The patient's last visit with me was on 4/30/2021.     Subjective:      Reason for referal: referred by No ref. provider found for management of low bone density.     HPI:   Melina Jean is a 58 y.o. female who presents for evaluation of Osteoporosis.    She was diagnosed with osteoporosis in 2018 based on low bone density on L-spine, dexa 3/2021 with decline.      Interval hx:  Was having chest pain, hospitalized and found to have Takasubo cardiomyopathy in 8/2022.  Now followed by cardiology and meds adjusted    Fosamax started 5/2021- tolerating w/o side effects   Has not had updated dexa    No falls/fractures since last visit             Secondary osteoporosis workup:   postmenopausal estrogen deficiency- menopause age 53  Lab Results   Component Value Date    CALCIUM 9.8 03/24/2023    ALBUMIN 4.9 03/24/2023    ESTGFRAFRICA 118 04/27/2021    EGFRNONAA 102 04/27/2021    PHOS 3.4 06/04/2022    ALKPHOS 62 03/24/2023    PTH 44 05/01/2021    TSH 1.56 03/24/2023    Vitamin D 44 6/2022    Previous Treatment:   Fosamax started 5/2021  History of previous fracture: Yes stress fracture in right leg, related to spinning?  Parent with fractured hip: No  Smoking status: no  Glucocorticoid use: No  History of RA: No  Alcohol 3 or more/day: No  Nephrolithiasis: Yes couple years ago had kidney stone once  Dental up to date: yes- no upcoming proceedure   Calcium - calcium 600 mg daily with about 1 serving of dairy daily   Vitamin D: not taking additional D   GERD - taking ranitidine BID - with gerd uncontrolled    Height loss - 1/4 inch    Exercising regularly with walking, weights, elliptical    Review of patient's allergies indicates:  No Known Allergies      Current Outpatient Medications:     alendronate (FOSAMAX) 70 MG tablet, TAKE ONE TABLET BY MOUTH ONCE WEEKLY ON EMPTY STOMACH. DO NOT RECLINE FOR 60 MINUTES AFTER, Disp: 12 tablet, Rfl: 3    ascorbic acid, vitamin C, 500  mg Chew, Take by mouth Daily., Disp: , Rfl:     bisoprolol (ZEBETA) 5 MG tablet, , Disp: , Rfl:     celecoxib (CELEBREX) 200 MG capsule, , Disp: , Rfl:     ENTRESTO 24-26 mg per tablet, , Disp: , Rfl:     EScitalopram oxalate (LEXAPRO) 10 MG tablet, Take 1 tablet (10 mg total) by mouth once daily., Disp: 90 tablet, Rfl: 3    famotidine (PEPCID) 20 MG tablet, Take 20 mg by mouth Daily., Disp: , Rfl:     zinc gluconate 50 mg tablet, Take 50 mg by mouth once daily., Disp: , Rfl:     magnesium 200 mg Tab, Take 400 mg by mouth Daily., Disp: , Rfl:     sulfamethoxazole-trimethoprim 800-160mg (BACTRIM DS) 800-160 mg Tab, Take 1 tablet by mouth 2 (two) times daily. (Patient not taking: Reported on 6/23/2023), Disp: 6 tablet, Rfl: 0          ROS:  See HPI    Objective:   Physical Exam   Wt 63.3 kg (139 lb 10.6 oz)   LMP  (LMP Unknown)   BMI 24.74 kg/m²   Wt Readings from Last 3 Encounters:   06/26/23 63.3 kg (139 lb 10.6 oz)   06/23/23 61.9 kg (136 lb 7.4 oz)   04/05/23 61.1 kg (134 lb 11.2 oz)   ]    Constitutional:  Pleasant,  in no acute distress.   HENT:   Eyes:     No scleral icterus.   Respiratory:   Effort normal   Neurological:  normal speech  Psych:  Normal mood and affect.        LABORATORY REVIEW:    Chemistry        Component Value Date/Time     03/24/2023 0857    K 4.4 03/24/2023 0857     03/24/2023 0857    CO2 30 03/24/2023 0857    BUN 13.0 03/24/2023 0857    CREATININE 0.58 03/24/2023 0857    GLU 96 03/24/2023 0857        Component Value Date/Time    CALCIUM 9.8 03/24/2023 0857    ALKPHOS 62 03/24/2023 0857    AST 20 03/24/2023 0857    ALT 17 03/24/2023 0857    BILITOT 0.4 03/24/2023 0857    ESTGFRAFRICA 118 04/27/2021 0758    EGFRNONAA 102 04/27/2021 0758        No results found for: HGBA1C    Assessment/Plan:     Problem List Items Addressed This Visit          1 - High    Osteoporosis - Primary     Risks include low weight,  race, menopause, PPI therapy.      Reviewed basics of  quantity versus quality  Reassuring she is not having fragility fractures (would not consider stress fracture to be related to osteoporosis)  Secondary workup negative    RDA of calcium (9020-2472 mg /day in divided doses)   Calcium from food sources preferred  Fall precautions emphasized  +weight bearing exercise  Check vitD    Continue fosamax as she is not having any GI side effects.  Repeat dexa at [facility] now then she will send me results    Discussed optimal duration of bisphosphonate use is unknown - drug holiday after 5-10 po versus drug hold ay after 3 reclast treatment     Continue fosamax and rtc in 1 year, sooner if clinical change           Relevant Medications    alendronate (FOSAMAX) 70 MG tablet    Other Relevant Orders    Vitamin D    DXA Bone Density Axial Skeleton 1 or more sites     Return to clinic in 12 months  Labs at [facility] in the next few month(s)     Karan Blanc MD

## 2023-06-26 NOTE — ASSESSMENT & PLAN NOTE
Risks include low weight,  race, menopause, PPI therapy.      Reviewed basics of quantity versus quality  Reassuring she is not having fragility fractures (would not consider stress fracture to be related to osteoporosis)  Secondary workup negative    RDA of calcium (0174-5905 mg /day in divided doses)   Calcium from food sources preferred  Fall precautions emphasized  +weight bearing exercise  Check vitD    Continue fosamax as she is not having any GI side effects.  Repeat dexa at [facility] now then she will send me results    Discussed optimal duration of bisphosphonate use is unknown - drug holiday after 5-10 po versus drug hold ay after 3 reclast treatment     Continue fosamax and rtc in 1 year, sooner if clinical change

## 2023-06-27 ENCOUNTER — PATIENT MESSAGE (OUTPATIENT)
Dept: ENDOCRINOLOGY | Facility: CLINIC | Age: 58
End: 2023-06-27
Payer: COMMERCIAL

## 2023-08-16 ENCOUNTER — PATIENT MESSAGE (OUTPATIENT)
Dept: OBSTETRICS AND GYNECOLOGY | Facility: CLINIC | Age: 58
End: 2023-08-16
Payer: COMMERCIAL

## 2023-09-01 ENCOUNTER — PATIENT MESSAGE (OUTPATIENT)
Dept: OBSTETRICS AND GYNECOLOGY | Facility: CLINIC | Age: 58
End: 2023-09-01
Payer: COMMERCIAL

## 2024-01-03 ENCOUNTER — PATIENT MESSAGE (OUTPATIENT)
Dept: PRIMARY CARE CLINIC | Facility: CLINIC | Age: 59
End: 2024-01-03
Payer: COMMERCIAL

## 2024-01-03 DIAGNOSIS — D50.9 IRON DEFICIENCY ANEMIA, UNSPECIFIED IRON DEFICIENCY ANEMIA TYPE: ICD-10-CM

## 2024-01-03 DIAGNOSIS — Z00.00 ANNUAL PHYSICAL EXAM: Primary | ICD-10-CM

## 2024-01-22 ENCOUNTER — PATIENT MESSAGE (OUTPATIENT)
Dept: PRIMARY CARE CLINIC | Facility: CLINIC | Age: 59
End: 2024-01-22
Payer: COMMERCIAL

## 2024-01-22 ENCOUNTER — PATIENT MESSAGE (OUTPATIENT)
Dept: ENDOCRINOLOGY | Facility: CLINIC | Age: 59
End: 2024-01-22
Payer: COMMERCIAL

## 2024-02-07 DIAGNOSIS — F41.9 ANXIETY: ICD-10-CM

## 2024-02-07 RX ORDER — ESCITALOPRAM OXALATE 10 MG/1
10 TABLET ORAL DAILY
Qty: 90 TABLET | Refills: 0 | Status: SHIPPED | OUTPATIENT
Start: 2024-02-07 | End: 2024-04-02 | Stop reason: SDUPTHER

## 2024-02-08 NOTE — TELEPHONE ENCOUNTER
No care due was identified.  [facility] Embedded Care Due Messages. Reference number: [id number].   2/07/2024 7:52:38 PM CST

## 2024-02-08 NOTE — TELEPHONE ENCOUNTER
Refill Decision Note   Melina Jean  is requesting a refill authorization.  Brief Assessment and Rationale for Refill:  Approve     Medication Therapy Plan:         Comments:     Note composed:10:26 PM 02/07/2024

## 2024-03-01 DIAGNOSIS — M81.0 AGE-RELATED OSTEOPOROSIS WITHOUT CURRENT PATHOLOGICAL FRACTURE: Primary | ICD-10-CM

## 2024-03-01 RX ORDER — ALENDRONATE SODIUM 70 MG/1
TABLET ORAL
Qty: 12 TABLET | Refills: 3 | Status: SHIPPED | OUTPATIENT
Start: 2024-03-01

## 2024-03-03 ENCOUNTER — PATIENT MESSAGE (OUTPATIENT)
Dept: PRIMARY CARE CLINIC | Facility: CLINIC | Age: 59
End: 2024-03-03
Payer: COMMERCIAL

## 2024-03-05 RX ORDER — ALPRAZOLAM 0.25 MG/1
0.25 TABLET ORAL DAILY PRN
Qty: 30 TABLET | Refills: 0 | Status: SHIPPED | OUTPATIENT
Start: 2024-03-05 | End: 2024-04-04

## 2024-03-11 ENCOUNTER — PATIENT MESSAGE (OUTPATIENT)
Dept: PRIMARY CARE CLINIC | Facility: CLINIC | Age: 59
End: 2024-03-11
Payer: COMMERCIAL

## 2024-03-13 LAB
25(OH)D3+25(OH)D2 SERPL-MCNC: 40 NG/ML (ref 30–100)
ALBUMIN SERPL-MCNC: 4.6 G/DL (ref 3.6–5.1)
ALBUMIN/GLOB SERPL: 2 (CALC) (ref 1–2.5)
ALP SERPL-CCNC: 58 U/L (ref 37–153)
ALT SERPL-CCNC: 18 U/L (ref 6–29)
AST SERPL-CCNC: 16 U/L (ref 10–35)
BASOPHILS # BLD AUTO: 32 CELLS/UL (ref 0–200)
BASOPHILS NFR BLD AUTO: 0.7 %
BILIRUB SERPL-MCNC: 0.4 MG/DL (ref 0.2–1.2)
BUN SERPL-MCNC: 14 MG/DL (ref 7–25)
BUN/CREAT SERPL: ABNORMAL (CALC) (ref 6–22)
CALCIUM SERPL-MCNC: 9.6 MG/DL (ref 8.6–10.4)
CHLORIDE SERPL-SCNC: 105 MMOL/L (ref 98–110)
CHOLEST SERPL-MCNC: 195 MG/DL
CHOLEST/HDLC SERPL: 2.8 (CALC)
CO2 SERPL-SCNC: 27 MMOL/L (ref 20–32)
CREAT SERPL-MCNC: 0.56 MG/DL (ref 0.5–1.03)
EGFR: 107 ML/MIN/1.73M2
EOSINOPHIL # BLD AUTO: 152 CELLS/UL (ref 15–500)
EOSINOPHIL NFR BLD AUTO: 3.3 %
ERYTHROCYTE [DISTWIDTH] IN BLOOD BY AUTOMATED COUNT: 11.8 % (ref 11–15)
GLOBULIN SER CALC-MCNC: 2.3 G/DL (CALC) (ref 1.9–3.7)
GLUCOSE SERPL-MCNC: 103 MG/DL (ref 65–99)
HCT VFR BLD AUTO: 42.4 % (ref 35–45)
HDLC SERPL-MCNC: 69 MG/DL
HGB BLD-MCNC: 14 G/DL (ref 11.7–15.5)
LDLC SERPL CALC-MCNC: 110 MG/DL (CALC)
LYMPHOCYTES # BLD AUTO: 2507 CELLS/UL (ref 850–3900)
LYMPHOCYTES NFR BLD AUTO: 54.5 %
MCH RBC QN AUTO: 32.3 PG (ref 27–33)
MCHC RBC AUTO-ENTMCNC: 33 G/DL (ref 32–36)
MCV RBC AUTO: 97.9 FL (ref 80–100)
MONOCYTES # BLD AUTO: 414 CELLS/UL (ref 200–950)
MONOCYTES NFR BLD AUTO: 9 %
NEUTROPHILS # BLD AUTO: 1495 CELLS/UL (ref 1500–7800)
NEUTROPHILS NFR BLD AUTO: 32.5 %
NONHDLC SERPL-MCNC: 126 MG/DL (CALC)
PLATELET # BLD AUTO: 335 THOUSAND/UL (ref 140–400)
PMV BLD REES-ECKER: 9.6 FL (ref 7.5–12.5)
POTASSIUM SERPL-SCNC: 4.3 MMOL/L (ref 3.5–5.3)
PROT SERPL-MCNC: 6.9 G/DL (ref 6.1–8.1)
RBC # BLD AUTO: 4.33 MILLION/UL (ref 3.8–5.1)
SODIUM SERPL-SCNC: 141 MMOL/L (ref 135–146)
T4 FREE SERPL-MCNC: 1 NG/DL (ref 0.8–1.8)
TRIGL SERPL-MCNC: 73 MG/DL
TSH SERPL-ACNC: 1.66 MIU/L (ref 0.4–4.5)
WBC # BLD AUTO: 4.6 THOUSAND/UL (ref 3.8–10.8)

## 2024-04-02 ENCOUNTER — OFFICE VISIT (OUTPATIENT)
Dept: PRIMARY CARE CLINIC | Facility: CLINIC | Age: 59
End: 2024-04-02
Payer: COMMERCIAL

## 2024-04-02 VITALS
OXYGEN SATURATION: 98 % | SYSTOLIC BLOOD PRESSURE: 108 MMHG | BODY MASS INDEX: 25.01 KG/M2 | HEIGHT: 63 IN | DIASTOLIC BLOOD PRESSURE: 64 MMHG | HEART RATE: 50 BPM | WEIGHT: 141.13 LBS

## 2024-04-02 DIAGNOSIS — Z86.79 HISTORY OF CARDIOMYOPATHY: ICD-10-CM

## 2024-04-02 DIAGNOSIS — M81.0 AGE-RELATED OSTEOPOROSIS WITHOUT CURRENT PATHOLOGICAL FRACTURE: ICD-10-CM

## 2024-04-02 DIAGNOSIS — D50.9 IRON DEFICIENCY ANEMIA, UNSPECIFIED IRON DEFICIENCY ANEMIA TYPE: ICD-10-CM

## 2024-04-02 DIAGNOSIS — Z00.00 ANNUAL PHYSICAL EXAM: ICD-10-CM

## 2024-04-02 DIAGNOSIS — K21.9 GERD WITHOUT ESOPHAGITIS: ICD-10-CM

## 2024-04-02 DIAGNOSIS — Z12.31 SCREENING MAMMOGRAM, ENCOUNTER FOR: ICD-10-CM

## 2024-04-02 DIAGNOSIS — R00.2 PALPITATIONS: ICD-10-CM

## 2024-04-02 DIAGNOSIS — F41.9 ANXIETY: ICD-10-CM

## 2024-04-02 DIAGNOSIS — F51.01 PRIMARY INSOMNIA: Primary | ICD-10-CM

## 2024-04-02 PROCEDURE — 3078F DIAST BP <80 MM HG: CPT | Mod: CPTII,S$GLB,, | Performed by: INTERNAL MEDICINE

## 2024-04-02 PROCEDURE — 99396 PREV VISIT EST AGE 40-64: CPT | Mod: S$GLB,,, | Performed by: INTERNAL MEDICINE

## 2024-04-02 PROCEDURE — 3008F BODY MASS INDEX DOCD: CPT | Mod: CPTII,S$GLB,, | Performed by: INTERNAL MEDICINE

## 2024-04-02 PROCEDURE — 99999 PR PBB SHADOW E&M-EST. PATIENT-LVL III: CPT | Mod: PBBFAC,,, | Performed by: INTERNAL MEDICINE

## 2024-04-02 PROCEDURE — 4010F ACE/ARB THERAPY RXD/TAKEN: CPT | Mod: CPTII,S$GLB,, | Performed by: INTERNAL MEDICINE

## 2024-04-02 PROCEDURE — 3074F SYST BP LT 130 MM HG: CPT | Mod: CPTII,S$GLB,, | Performed by: INTERNAL MEDICINE

## 2024-04-02 RX ORDER — ESCITALOPRAM OXALATE 10 MG/1
10 TABLET ORAL DAILY
Qty: 90 TABLET | Refills: 3 | Status: SHIPPED | OUTPATIENT
Start: 2024-04-02

## 2024-04-02 RX ORDER — HYDROXYZINE PAMOATE 25 MG/1
25 CAPSULE ORAL NIGHTLY PRN
Qty: 30 CAPSULE | Refills: 2 | Status: SHIPPED | OUTPATIENT
Start: 2024-04-02

## 2024-04-02 NOTE — PROGRESS NOTES
[facility] Note    Chief Complaint      Chief Complaint   Patient presents with    Annual Exam       History of Present Illness      Melina Jean is a 58 y.o. female with chronic conditions of MIQUEL, GERD, anxiety, chronic neck and back pain  who presents today for: annual preventative visit.   Takatsubo's cardiomyopathy, Chronic palpitations: Sees Dr. Aiken, cardiology.  Echo scheduled next month.  On entresto and bisoprolol.    GERD: Controlled on famotidine daily.  Had EGD last year with Dr. Cerrato.    Osteopenia: Sees Dr. Blanc.  On fosamax weekly.    Anxiety: better controlled on lexapro, xanax as needed.    Diet: Prepares own food mostly.  Limiting fatty foods and carbs.  Drinks plenty water.    Exercise: stays active.  Elliptical and walks.    Denies drinking and driving, drinking more than 4 drinks on occasion, drug use.     Flu shot declines.  Td 2019.  Shingrix UTD.  COVID vaccine UTD.  Pneumonia vaccine due age 67.  Mammogram 2022.  Sees Dr. Feng.  DEXA UTD 2018.   Cscope Dr. Cerrato, 2019, no polyps, 5 yr interval for family history of colon cancer in mother. Due this year.     Past Medical History:  Past Medical History:   Diagnosis Date    Broken heart syndrome 08/2022    GERD (gastroesophageal reflux disease)     Osteoporosis        Past Surgical History:   has a past surgical history that includes Cholecystectomy and Tonsillectomy.    Family History:  family history includes COPD in her father and mother; Cancer in her mother; Colon cancer in her mother; No Known Problems in her brother.     Social History:  Social History     Tobacco Use    Smoking status: Never    Smokeless tobacco: Never   Substance Use Topics    Alcohol use: Yes     Alcohol/week: 4.0 standard drinks of alcohol     Types: 1 Glasses of wine, 3 Cans of beer per week     Comment: Socially not every week    Drug use: Never       I personally reviewed all past medical, surgical, social and family  history.    Review of Systems   HENT:  Negative for hearing loss.    Eyes:  Negative for discharge.   Respiratory:  Negative for wheezing.    Cardiovascular:  Negative for chest pain and palpitations.   Gastrointestinal:  Negative for blood in stool, constipation, diarrhea and vomiting.   Genitourinary:  Negative for dysuria and hematuria.   Musculoskeletal:  Negative for neck pain.   Neurological:  Negative for weakness and headaches.   Endo/Heme/Allergies:  Negative for polydipsia.        Medications:  Outpatient Encounter Medications as of 4/2/2024   Medication Sig Dispense Refill    alendronate (FOSAMAX) 70 MG tablet TAKE 1 TABLET BY MOUTH ONCE WEEKLY ON EMPTY STOMACH. DO NOT RECLINE FOR 60 MINUTES AFTER. 12 tablet 3    ALPRAZolam (XANAX) 0.25 MG tablet Take 1 tablet (0.25 mg total) by mouth daily as needed for Anxiety. 30 tablet 0    ascorbic acid, vitamin C, 500 mg Chew Take by mouth Daily.      bisoprolol (ZEBETA) 5 MG tablet       calcium carbonate (OS-NICOLETTE) 600 mg calcium (1,500 mg) Tab Take 600 mg by mouth once.      celecoxib (CELEBREX) 200 MG capsule       ENTRESTO 24-26 mg per tablet       EScitalopram oxalate (LEXAPRO) 10 MG tablet Take 1 tablet (10 mg total) by mouth once daily. 90 tablet 3    famotidine (PEPCID) 20 MG tablet Take 20 mg by mouth Daily.      hydrOXYzine pamoate (VISTARIL) 25 MG Cap Take 1 capsule (25 mg total) by mouth nightly as needed (insomnia). 30 capsule 2    zinc gluconate 50 mg tablet Take 50 mg by mouth once daily.      [DISCONTINUED] EScitalopram oxalate (LEXAPRO) 10 MG tablet Take 1 tablet (10 mg total) by mouth once daily. 90 tablet 0     No facility-administered encounter medications on file as of 4/2/2024.       Allergies:  Review of patient's allergies indicates:  No Known Allergies    Health Maintenance:  Immunization History   Administered Date(s) Administered    COVID-19, vector-nr, rS-Ad26, PF (Cristy) 04/01/2021    Influenza 10/15/2021    Influenza - Quadrivalent  "10/03/2017, 10/19/2018, 11/19/2021    Influenza - Quadrivalent - MDCK - PF 10/17/2020    Influenza - Quadrivalent - PF *Preferred* (6 months and older) 09/24/2019    Tdap 11/22/2019    Zoster 09/25/2018, 11/28/2018    Zoster Recombinant 09/27/2018, 11/29/2018      Health Maintenance   Topic Date Due    Shingles Vaccine (3 of 3) 01/24/2019    Mammogram  05/01/2024    Colorectal Cancer Screening  11/21/2024    Lipid Panel  03/12/2029    TETANUS VACCINE  11/22/2029    Hepatitis C Screening  Completed        Physical Exam      Vital Signs  Pulse: (!) 50  SpO2: 98 %  BP: 108/64  BP Location: Left arm  Patient Position: Sitting  Pain Score: 0-No pain  Height and Weight  Height: 5' 3" (160 cm)  Weight: 64 kg (141 lb 1.5 oz)  BSA (Calculated - sq m): 1.69 sq meters  BMI (Calculated): 25  Weight in (lb) to have BMI = 25: 140.8]    Physical Exam  Vitals reviewed.   Constitutional:       Appearance: She is well-developed.   HENT:      Head: Normocephalic and atraumatic.      Right Ear: External ear normal.      Left Ear: External ear normal.   Cardiovascular:      Rate and Rhythm: Normal rate and regular rhythm.      Heart sounds: Normal heart sounds. No murmur heard.  Pulmonary:      Effort: Pulmonary effort is normal.      Breath sounds: Normal breath sounds. No wheezing or rales.   Abdominal:      General: Bowel sounds are normal. There is no distension.      Palpations: Abdomen is soft.      Tenderness: There is no abdominal tenderness.          Laboratory:  CBC:  Recent Labs   Lab 04/27/21  0758 03/24/23  0857 03/12/24  0757   WBC 4.1 4.1 L 4.6   RBC 4.32 4.24 4.33   Hemoglobin 13.8 13.9 14.0   Hematocrit 41.4 42.0 42.4   Platelets 313 294 335   MCV 95.8 99.0 97.9   MCH 31.9 32.7 32.3   MCHC 33.3 33.0 33.0     CMP:  Recent Labs   Lab 04/27/21  0758 05/01/21  0950 03/24/23  0857 03/12/24  0757   Glucose 86   < > 96 103 H   Calcium 9.9   < > 9.8 9.6   Albumin 4.4  --   --  4.6   ALBUMIN  --    < > 4.9  --    Total Protein " 6.7   < > 6.9 6.9   Sodium 143   < > 141 141   Potassium 4.2   < > 4.4 4.3   CO2 28   < > 30 27   Chloride 106   < > 103 105   BUN 12   < > 13.0 14   Alkaline Phosphatase  --   --  62  --    ALT 11  --  17 18   AST 13  --  20 16   Total Bilirubin 0.5  --  0.4 0.4    < > = values in this interval not displayed.     URINALYSIS:  Recent Labs   Lab 05/05/23  0909   Color, UA DARK YELLOW   Specific Gravity, UA 1.012   pH, UA 7.5   Protein, UA 2+ A   Glucose, UA NEGATIVE   Bacteria, UA FEW A   Nitrite, UA POSITIVE A   Leukocytes, UA 3+ A   Hyaline Casts, UA NONE SEEN      LIPIDS:  Recent Labs   Lab 04/27/21  0758 06/04/22  1004 03/24/23  0857 03/12/24  0757   TSH 1.73 1.20 1.56 1.66   HDL 62  --  67 69   Cholesterol 173  --  170 195   Triglycerides 116  --  63 73   LDL Cholesterol 90  --   --  110 H   LDL Calculated  --   --  93  --    HDL/Cholesterol Ratio 2.8  --   --  2.8   Non HDL Chol. (LDL+VLDL) 111  --   --  126     TSH:  Recent Labs   Lab 06/04/22  1004 03/24/23  0857 03/12/24  0757   TSH 1.20 1.56 1.66     A1C:        Assessment/Plan     Melina Jean is a 58 y.o.female with:    1. Annual physical exam  Discussed diet and exercise, vaccines and cancer screening, risk factors.  Screening labs reviewed  2. Primary insomnia  - hydrOXYzine pamoate (VISTARIL) 25 MG Cap; Take 1 capsule (25 mg total) by mouth nightly as needed (insomnia).  Dispense: 30 capsule; Refill: 2  Start vistaril nightly to see if better controls insomnia.  3. Iron deficiency anemia, unspecified iron deficiency anemia type  Reviewed labs  4. GERD without esophagitis  Continue current meds.    5. Palpitations  6. History of cardiomyopathy  Cont to monitor.  F/U with cardiology as needed  7. Anxiety  - EScitalopram oxalate (LEXAPRO) 10 MG tablet; Take 1 tablet (10 mg total) by mouth once daily.  Dispense: 90 tablet; Refill: 3  Continue current meds.    8. Age-related osteoporosis without current pathological fracture  Continue current meds.     9. Screening mammogram, encounter for  - Mammo Digital Screening Bilat w/ [facility]; Future       Chronic conditions status updated as per HPI.  Other than changes above, cont current medications and maintain follow up with specialists.  No follow-ups on file.    No future appointments.    Eliud Gerard MD  [facility]                  Answers submitted by the patient for this visit:  Review of Systems Questionnaire (Submitted on 4/1/2024)  activity change: No  unexpected weight change: No  rhinorrhea: No  trouble swallowing: No  visual disturbance: No  chest tightness: No  polyuria: No  difficulty urinating: No  menstrual problem: No  joint swelling: No  arthralgias: No  confusion: No  dysphoric mood: No

## 2024-05-07 ENCOUNTER — PATIENT MESSAGE (OUTPATIENT)
Dept: PRIMARY CARE CLINIC | Facility: CLINIC | Age: 59
End: 2024-05-07
Payer: COMMERCIAL

## 2024-05-07 DIAGNOSIS — R92.8 ABNORMAL MAMMOGRAM: Primary | ICD-10-CM

## 2024-05-13 NOTE — TELEPHONE ENCOUNTER
Pt states she received a call from Mingly about abnormal mammo, requesting orders for US and diagnostic mammo to be faxed to [facility]. Pended orders.

## 2024-06-29 ENCOUNTER — PATIENT MESSAGE (OUTPATIENT)
Dept: OBSTETRICS AND GYNECOLOGY | Facility: CLINIC | Age: 59
End: 2024-06-29
Payer: COMMERCIAL

## 2024-07-09 ENCOUNTER — OFFICE VISIT (OUTPATIENT)
Dept: OBSTETRICS AND GYNECOLOGY | Facility: CLINIC | Age: 59
End: 2024-07-09
Payer: COMMERCIAL

## 2024-07-09 VITALS
HEIGHT: 63 IN | SYSTOLIC BLOOD PRESSURE: 114 MMHG | BODY MASS INDEX: 25.16 KG/M2 | WEIGHT: 142 LBS | DIASTOLIC BLOOD PRESSURE: 70 MMHG

## 2024-07-09 DIAGNOSIS — Z11.51 ENCOUNTER FOR SCREENING FOR HUMAN PAPILLOMAVIRUS (HPV): ICD-10-CM

## 2024-07-09 DIAGNOSIS — M81.0 AGE-RELATED OSTEOPOROSIS WITHOUT CURRENT PATHOLOGICAL FRACTURE: ICD-10-CM

## 2024-07-09 DIAGNOSIS — Z12.4 CERVICAL CANCER SCREENING: ICD-10-CM

## 2024-07-09 DIAGNOSIS — N60.01 BREAST CYST, RIGHT: Primary | ICD-10-CM

## 2024-07-09 DIAGNOSIS — Z01.419 ENCOUNTER FOR GYNECOLOGICAL EXAMINATION: ICD-10-CM

## 2024-07-09 PROCEDURE — 4010F ACE/ARB THERAPY RXD/TAKEN: CPT | Mod: CPTII,S$GLB,, | Performed by: STUDENT IN AN ORGANIZED HEALTH CARE EDUCATION/TRAINING PROGRAM

## 2024-07-09 PROCEDURE — 87624 HPV HI-RISK TYP POOLED RSLT: CPT | Performed by: STUDENT IN AN ORGANIZED HEALTH CARE EDUCATION/TRAINING PROGRAM

## 2024-07-09 PROCEDURE — 3078F DIAST BP <80 MM HG: CPT | Mod: CPTII,S$GLB,, | Performed by: STUDENT IN AN ORGANIZED HEALTH CARE EDUCATION/TRAINING PROGRAM

## 2024-07-09 PROCEDURE — 3074F SYST BP LT 130 MM HG: CPT | Mod: CPTII,S$GLB,, | Performed by: STUDENT IN AN ORGANIZED HEALTH CARE EDUCATION/TRAINING PROGRAM

## 2024-07-09 PROCEDURE — 99396 PREV VISIT EST AGE 40-64: CPT | Mod: S$GLB,,, | Performed by: STUDENT IN AN ORGANIZED HEALTH CARE EDUCATION/TRAINING PROGRAM

## 2024-07-09 PROCEDURE — 88175 CYTOPATH C/V AUTO FLUID REDO: CPT | Performed by: STUDENT IN AN ORGANIZED HEALTH CARE EDUCATION/TRAINING PROGRAM

## 2024-07-09 PROCEDURE — 1160F RVW MEDS BY RX/DR IN RCRD: CPT | Mod: CPTII,S$GLB,, | Performed by: STUDENT IN AN ORGANIZED HEALTH CARE EDUCATION/TRAINING PROGRAM

## 2024-07-09 PROCEDURE — 1159F MED LIST DOCD IN RCRD: CPT | Mod: CPTII,S$GLB,, | Performed by: STUDENT IN AN ORGANIZED HEALTH CARE EDUCATION/TRAINING PROGRAM

## 2024-07-09 PROCEDURE — 99999 PR PBB SHADOW E&M-EST. PATIENT-LVL IV: CPT | Mod: PBBFAC,,, | Performed by: STUDENT IN AN ORGANIZED HEALTH CARE EDUCATION/TRAINING PROGRAM

## 2024-07-09 PROCEDURE — 3008F BODY MASS INDEX DOCD: CPT | Mod: CPTII,S$GLB,, | Performed by: STUDENT IN AN ORGANIZED HEALTH CARE EDUCATION/TRAINING PROGRAM

## 2024-07-09 RX ORDER — BISOPROLOL FUMARATE 5 MG/1
0.5 TABLET, FILM COATED ORAL EVERY MORNING
COMMUNITY
Start: 2023-10-18

## 2024-07-09 RX ORDER — MELOXICAM 15 MG/1
15 TABLET ORAL
COMMUNITY
Start: 2024-05-18

## 2024-07-09 NOTE — PROGRESS NOTES
"  Chief Complaint: Well Woman Exam     HPI:      Melina Jean is a 59 y.o.  who presents for annual exam. Postmenopausal.    Today patient's GYN complaints include: none. Mild infrequent hot flashes.   Specifically, patient denies abnormal vaginal bleeding, discharge, pelvic pain, urinary problems.    Ms. Jean is currently sexually active with a single male partner, .    Previous Pap: NILM, HPV negative (2021)  Previous Mammogram: BiRads: {NUMBER 1-5:98208} T-C Score: *** (***) Results for orders placed in visit on 21    Mammo Digital Screening Bilat w/ Earle     Most Recent Dexa: {Blank single:::"WNL","Osteoporosis, pt on ***","Osteopenia"} (***/***), Repeat in 20***  Most Recent Colonoscopy: {Blank single:::"WNL","WNL per patient","Benign polyps"} (***/***), Repeat in 20***    Past Medical History:   Diagnosis Date    Broken heart syndrome 2022    GERD (gastroesophageal reflux disease)     Osteoporosis        Past Surgical History:   Procedure Laterality Date    CHOLECYSTECTOMY      TONSILLECTOMY         Social History     Socioeconomic History    Marital status:    Tobacco Use    Smoking status: Never    Smokeless tobacco: Never   Substance and Sexual Activity    Alcohol use: Yes     Alcohol/week: 4.0 standard drinks of alcohol     Types: 1 Glasses of wine, 3 Cans of beer per week     Comment: Socially not every week    Drug use: Never    Sexual activity: Yes     Partners: Male     Birth control/protection: None     Social Determinants of Health     Financial Resource Strain: Low Risk  (10/10/2022)    Received from [facility] [employer] [facility]    Overall Financial Resource Strain (CARDIA)     Difficulty of Paying Living Expenses: Not hard at all   Food Insecurity: No Food Insecurity (10/10/2022)    Received from [facility] [employer], [facility] [employer] [facility] [facility]    Hunger Vital Sign     Worried About Running Out of Food in the Last Year: Never true     Ran Out of Food in the Last Year: " "Never true   Transportation Needs: No Transportation Needs (10/10/2022)    Received from [facility] [place] [facility]    PRAPARE - Transportation     Lack of Transportation (Medical): No     Lack of Transportation (Non-Medical): No   Physical Activity: Unknown (10/10/2022)    [facility]    Exercise Vital Sign     Days of Exercise per Week: Patient declined   Stress: No Stress Concern Present (10/10/2022)    Received from [facility] [place] [employer] [facility]    [employer] - Occupational Stress Questionnaire     Feeling of Stress : Only a little   Housing Stability: Low Risk  (10/10/2022)    Received from [facility] [place] [employer]    Housing Stability Vital Sign     Unable to Pay for Housing in the Last Year: No     Number of Places Lived in the Last Year: 1     In the last 12 months, was there a time when you did not have a steady place to sleep or slept in a shelter (including now)?: No       Family History   Problem Relation Name Age of Onset    Colon cancer Mother Caroline Eulogio LEONORAnthony Zainabcolleen     Cancer Mother Caroline QUARLES Stellajackicolleen         Colon    COPD Mother Caroline Eulogio Maynard         Smoker    COPD Father Elijah Maynard          2021 due to Covid-19 (Afib)    No Known Problems Brother         Review of patient's allergies indicates:  No Known Allergies    OB History          2    Para   2    Term   2            AB        Living   2         SAB        IAB        Ectopic        Multiple        Live Births   2           Obstetric Comments   Menarche age 15               Physical Exam:      PHYSICAL EXAM:  /70 (BP Location: Left arm, Patient Position: Sitting)   Ht 5' 3" (1.6 m)   Wt 64.4 kg (141 lb 15.6 oz)   BMI 25.15 kg/m²   Body mass index is 25.15 kg/m².     APPEARANCE: Well nourished, well developed, in no acute distress.  PSYCH: Appropriate mood and affect.  SKIN: No acne or hirsutism  NECK: Neck symmetric without masses or " "thyromegaly  NODES: No inguinal, axillary, or supraclavicular lymph node enlargement  CHEST: Normal respiratory effort.  ABDOMEN: Soft.  No tenderness or masses.   BREASTS: Symmetrical, {Blank single:64977::"well healed surgical scars","no visible skin lesions"}. {Blank single:46273::"Implants symmetric without defects noted","Lumpy bumpy feel to bilateral breasts","No palpable masses"}. No nipple discharge bilaterally.  PELVIC: Normal external genitalia without lesions.  Normal hair distribution.  Adequate perineal body, normal urethral meatus.  Vagina {Blank single:30491::"moist and smooth","moist and well rugated"}. Without lesions. {WITH-WITHOUT:56827}discharge.  {Blank single:78016::"Normal appearing vaginal cuff","Cervix pink, without lesions, discharge or tenderness"}.  No significant cystocele or rectocele.  Bimanual exam shows {Blank single:60346::"no midline or adnexal masses","uterus to be normal size, regular, mobile and nontender.  Adnexa without masses or tenderness"}.      Assessment/Plan:     Encounter for gynecological examination    Cervical cancer screening  -     Liquid-Based Pap Smear, Screening    Encounter for screening for human papillomavirus (HPV)  -     HPV High Risk Genotypes, PCR    Age-related osteoporosis without current pathological fracture  -     Ambulatory referral/consult to Endocrinology; Future; Expected date: 07/16/2024    *** make sure US order sent to [facility]!!     No follow-ups on file.    Counseling:     Patient was counseled today on current ASCCP pap guidelines, the recommendation for yearly physical exams, safe driving habits, {Blank multiple:56626::"breast self awareness","annual mammograms"}. She is to see her PCP for other health maintenance.       Use of the [facility] Patient Portal discussed and encouraged during today's visit.     "

## 2024-07-10 PROBLEM — N60.01 BREAST CYST, RIGHT: Status: ACTIVE | Noted: 2024-07-10

## 2024-07-14 LAB
FINAL PATHOLOGIC DIAGNOSIS: NORMAL
Lab: NORMAL

## 2024-08-08 DIAGNOSIS — F41.9 ANXIETY: ICD-10-CM

## 2024-08-08 RX ORDER — ESCITALOPRAM OXALATE 10 MG/1
10 TABLET ORAL
Qty: 90 TABLET | Refills: 2 | Status: SHIPPED | OUTPATIENT
Start: 2024-08-08

## 2024-08-26 ENCOUNTER — OFFICE VISIT (OUTPATIENT)
Dept: ENDOCRINOLOGY | Facility: CLINIC | Age: 59
End: 2024-08-26
Payer: COMMERCIAL

## 2024-08-26 VITALS
HEART RATE: 47 BPM | HEIGHT: 63 IN | OXYGEN SATURATION: 99 % | SYSTOLIC BLOOD PRESSURE: 110 MMHG | DIASTOLIC BLOOD PRESSURE: 68 MMHG | BODY MASS INDEX: 25.2 KG/M2 | WEIGHT: 142.19 LBS

## 2024-08-26 DIAGNOSIS — K21.9 GERD WITHOUT ESOPHAGITIS: Primary | ICD-10-CM

## 2024-08-26 DIAGNOSIS — M81.0 AGE-RELATED OSTEOPOROSIS WITHOUT CURRENT PATHOLOGICAL FRACTURE: ICD-10-CM

## 2024-08-26 PROCEDURE — 1159F MED LIST DOCD IN RCRD: CPT | Mod: CPTII,S$GLB,, | Performed by: STUDENT IN AN ORGANIZED HEALTH CARE EDUCATION/TRAINING PROGRAM

## 2024-08-26 PROCEDURE — 3008F BODY MASS INDEX DOCD: CPT | Mod: CPTII,S$GLB,, | Performed by: STUDENT IN AN ORGANIZED HEALTH CARE EDUCATION/TRAINING PROGRAM

## 2024-08-26 PROCEDURE — 3078F DIAST BP <80 MM HG: CPT | Mod: CPTII,S$GLB,, | Performed by: STUDENT IN AN ORGANIZED HEALTH CARE EDUCATION/TRAINING PROGRAM

## 2024-08-26 PROCEDURE — 4010F ACE/ARB THERAPY RXD/TAKEN: CPT | Mod: CPTII,S$GLB,, | Performed by: STUDENT IN AN ORGANIZED HEALTH CARE EDUCATION/TRAINING PROGRAM

## 2024-08-26 PROCEDURE — 1160F RVW MEDS BY RX/DR IN RCRD: CPT | Mod: CPTII,S$GLB,, | Performed by: STUDENT IN AN ORGANIZED HEALTH CARE EDUCATION/TRAINING PROGRAM

## 2024-08-26 PROCEDURE — 3074F SYST BP LT 130 MM HG: CPT | Mod: CPTII,S$GLB,, | Performed by: STUDENT IN AN ORGANIZED HEALTH CARE EDUCATION/TRAINING PROGRAM

## 2024-08-26 PROCEDURE — 99214 OFFICE O/P EST MOD 30 MIN: CPT | Mod: S$GLB,,, | Performed by: STUDENT IN AN ORGANIZED HEALTH CARE EDUCATION/TRAINING PROGRAM

## 2024-08-26 PROCEDURE — 99999 PR PBB SHADOW E&M-EST. PATIENT-LVL V: CPT | Mod: PBBFAC,,, | Performed by: STUDENT IN AN ORGANIZED HEALTH CARE EDUCATION/TRAINING PROGRAM

## 2024-08-26 RX ORDER — ALENDRONATE SODIUM 70 MG/1
TABLET ORAL
Qty: 12 TABLET | Refills: 3 | Status: SHIPPED | OUTPATIENT
Start: 2024-08-26

## 2024-08-26 NOTE — PROGRESS NOTES
ENDOCRINOLOGY CLINIC  FOLLOW UP  08/26/2024     The patient's last visit with me was on 4/30/2021.     Subjective:      Reason for referal: referred by Lisa Jiménez MD for management of low bone density.     HPI:   Melina Jean is a 59 y.o. female who presents for evaluation of Osteoporosis.    She was diagnosed with osteoporosis in 2018 based on low bone density on L-spine, dexa 3/2021 with decline.      Was having chest pain, hospitalized and found to have Takasubo cardiomyopathy in 8/2022.  Now followed by cardiology and meds adjusted    Last seen by Dr Blanc 6/2023. At that time Fosamax was continued and she was due for repeat [place]XA [place].      Fosamax started 5/2021- tolerating w/o side effects     No falls/fractures since last visit   Doing yoga to work on balance       DXA 9/2023 [place] (unable to see images, but we can see the report below):  Findings:   CT bone mineral density scans were completed of the axial skeleton at the lumbar spine and at both hips.      The mean bone mineral density in the measured levels of the L1-L4 vertebra is 0.914 g/cm2.  This is equal to an age-adjusted T score of -2.2.      The bone mineral density in the left femoral neck is 0.890 g/cm2.  This is equal to an age-adjusted T score of -1.1.     The bone mineral density in the right femoral neck is 0.845 g/cm2.  This is equal to an age-adjusted T score of -1.4.       1. Osteopenia in the mean of the measured levels of the L1-L4 vertebra. The mean bone mineral density has increased since the previous exam, when it was in the osteoporotic range.   2. Osteopenia in the left femoral neck. The bone mineral density has increased since the previous exam, when is in the osteopenic range.   3. Osteopenia in the right femoral neck. The bone mineral density has increased since the previous exam, when it was in the osteopenic range.     FRAX 10 year probability of fracture:   Major osteoporotic fracture: 6.5%   Hip fracture: 0.5%                  Secondary osteoporosis workup:   postmenopausal estrogen deficiency- menopause age 53  Lab Results   Component Value Date    CALCIUM 9.6 03/12/2024    ALBUMIN 4.6 03/12/2024    ESTGFRAFRICA 118 04/27/2021    EGFRNONAA 102 04/27/2021    PHOS 3.4 06/04/2022    ALKPHOS 62 03/24/2023    PTH 44 05/01/2021    TSH 1.66 03/12/2024    Vitamin D 44 6/2022    Previous Treatment:   Fosamax started 5/2021  History of previous fracture: Yes stress fracture in right leg, related to spinning?  Parent with fractured hip: No  Smoking status: no  Glucocorticoid use: No  History of RA: No  Alcohol 3 or more/day: No  Nephrolithiasis: Yes couple years ago had kidney stone once  Dental up to date: yes- no upcoming proceedure   Calcium - calcium citrate 600 mg daily with about 1 serving of dairy daily   Vitamin D: not taking additional D   GERD - taking famotidine     Height loss - 1/4 inch    Exercising regularly with walking, weights, elliptical    Review of patient's allergies indicates:  No Known Allergies      Current Outpatient Medications:     alendronate (FOSAMAX) 70 MG tablet, TAKE 1 TABLET BY MOUTH ONCE WEEKLY ON EMPTY STOMACH. DO NOT RECLINE FOR 60 MINUTES AFTER., Disp: 12 tablet, Rfl: 3    ALPRAZolam (XANAX) 0.25 MG tablet, Take 1 tablet (0.25 mg total) by mouth daily as needed for Anxiety., Disp: 30 tablet, Rfl: 0    ascorbic acid, vitamin C, 500 mg Chew, Take by mouth Daily., Disp: , Rfl:     bisoprolol (ZEBETA) 5 MG tablet, Take 0.5 tablets by mouth every morning., Disp: , Rfl:     calcium carbonate (OS-NICOLETTE) 600 mg calcium (1,500 mg) Tab, Take 600 mg by mouth once., Disp: , Rfl:     ENTRESTO 24-26 mg per tablet, , Disp: , Rfl:     EScitalopram oxalate (LEXAPRO) 10 MG tablet, TAKE ONE TABLET BY MOUTH EVERY DAY, Disp: 90 tablet, Rfl: 2    famotidine (PEPCID) 20 MG tablet, Take 20 mg by mouth Daily., Disp: , Rfl:     hydrOXYzine pamoate (VISTARIL) 25 MG Cap, Take 1 capsule (25 mg total) by mouth nightly as  "needed (insomnia)., Disp: 30 capsule, Rfl: 2    meloxicam (MOBIC) 15 MG tablet, Take 15 mg by mouth., Disp: , Rfl:           ROS:  See HPI    Objective:   Physical Exam   There were no vitals taken for this visit.  Wt Readings from Last 3 Encounters:   07/09/24 64.4 kg (141 lb 15.6 oz)   04/02/24 64 kg (141 lb 1.5 oz)   06/26/23 63.3 kg (139 lb 10.6 oz)   ]    Constitutional:  Pleasant,  in no acute distress.   HENT:   Eyes:     No scleral icterus.   Respiratory:   Effort normal   Neurological:  normal speech  Psych:  Normal mood and affect.        LABORATORY REVIEW:    Chemistry        Component Value Date/Time     03/12/2024 0757    K 4.3 03/12/2024 0757     03/12/2024 0757    CO2 27 03/12/2024 0757    BUN 14 03/12/2024 0757    BUN 13.0 03/24/2023 0857    CREATININE 0.56 03/12/2024 0757     (H) 03/12/2024 0757        Component Value Date/Time    CALCIUM 9.6 03/12/2024 0757    ALKPHOS 62 03/24/2023 0857    AST 16 03/12/2024 0757    ALT 18 03/12/2024 0757    BILITOT 0.4 03/12/2024 0757    ESTGFRAFRICA 118 04/27/2021 0758    EGFRNONAA 102 04/27/2021 0758        No results found for: "HGBA1C"    Assessment/Plan:     Problem List Items Addressed This Visit    None      Return to clinic in 12 months  Labs at [facility] in the next few month(s)       Katie Mejia MD      "

## 2024-08-26 NOTE — PATIENT INSTRUCTIONS
Follow up with us in 1 year. You will be due for your next bone density scan in September 2025.    Vitamin D3 - take 2000 units daily - this is over the counter    For calcium intake:  I advise you get 1200 mg per day of calcium, split up over the day into 2 to 4 divided doses.  This amount includes calcium from both dietary sources and/or calcium supplements, and it is best to get smaller amounts throughout the day rather than all of the calcium at one time; this allows for better absorption of the calcium.     To estimate calcium content from a nutrition label, the label lists the % calcium in that food.   For example, the label for an 8 oz glass of milk lists the calcium content as 30%.  This is equivalent to 300 mg of calcium (multiply the % by 10 to get the mg of calcium per serving).  It is best to try to get the majority of calcium intake from the diet.  I've included a table below of some calcium-rich foods.    If you are not getting enough calcium in the diet, then you can add low doses of calcium supplements.  For calcium supplements, your body can only absorb about 500-600 mg of calcium at one time.  Thus, it is best to split the tablets up over the course of a day.  It is also best to avoid taking calcium supplements at the same time as a calcium-rich food to maximize your calcium absorption.  Calcium carbonate is best taken with or after a meal.  Calcium citrate can be taken on an empty stomach or with/after a meal.  Please look at any multivitamin you are taking as these often usually contain calcium as well.    Estimated Calcium Content of Foods:  Produce  Serving Size Estimated Calcium*    Trino greens, frozen 8 oz 360 mg   Broccoli patti 8 oz 200 mg   Kale, frozen 8 oz 180 mg   Soy Beans, green, boiled 8 oz 175 mg   Bok Jung, cooked, boiled 8 oz 160 mg   Figs, dried 2 figs 65 mg   Broccoli, fresh, cooked 8 oz 60 mg   Oranges 1 whole 55 mg   Seafood Serving Size Estimated Calcium*    Sardines,  canned with bones 3 oz 325 mg   Berino, canned with bones 3 oz 180 mg   Shrimp, canned 3 oz 125 mg   Dairy Serving Size Estimated Calcium*    Ricotta, part-skim 4 oz 335 mg   Yogurt, plain, low-fat 6 oz 310 mg   Milk, skim, low-fat, whole 8 oz 300 mg   Yogurt with fruit, low-fat 6 oz 260 mg   Mozzarella, part-skim 1 oz 210 mg   Cheddar 1 oz 205 mg   Yogurt, Greek 6 oz 200 mg   American Cheese 1 oz 195 mg   Feta Cheese 4 oz 140 mg   Cottage Cheese, 2% 4 oz 105 mg   Frozen yogurt, vanilla 8 oz 105 mg   Ice Cream, vanilla 8 oz 85 mg   Parmesan 1 tbsp 55 mg   Fortified Food Serving Size Estimated Calcium*   [place] milk, rice milk or soy milk, fortified 8 oz 300 mg   Orange juice and other fruit juices, fortified 8 oz 300 mg   Tofu, prepared with calcium 4 oz 205 mg   Waffle, frozen, fortified 2 pieces 200 mg   Oatmeal, fortified 1 packet 140 mg   English muffin, fortified 1 muffin 100 mg   Cereal, fortified 8 oz 100-1,000 mg   Other Serving Size Estimated Calcium*   Mac & cheese, frozen 1 package 325 mg   Pizza, cheese, frozen 1 serving 115 mg   Pudding, chocolate, prepared with 2% milk 4 oz 160 mg   Beans, baked, canned 4 oz 160 mg   *The calcium content listed for most foods is estimated and can vary due to multiple factors. Check the food label to determine how much calcium is in a particular product.  If you read the nutrition label for a food source, it lists the % calcium in that food.  For an 8 oz glass of milk, for example, the label states calcium 30%.  This is equivalent to 300 mg of calcium (multiply the listed number by 10).   **Table from the National Osteoporosis Foundation

## 2024-08-26 NOTE — PROGRESS NOTES
ENDOCRINOLOGY CLINIC  FOLLOW UP  08/26/2024     The patient's last visit with me was on 4/30/2021.     Subjective:      Reason for referal: referred by Lisa Jiménez MD for management of low bone density.     HPI:   Melina Jean is a 59 y.o. female who presents for evaluation of Osteoporosis.    She was diagnosed with osteoporosis in 2018 based on low bone density on L-spine, dexa 3/2021 with decline.      Was having chest pain, hospitalized and found to have Takasubo cardiomyopathy in 8/2022.  Now followed by cardiology and meds adjusted    Last seen by Dr Blanc 6/2023. At that time Fosamax was continued.    Fosamax started 5/2021- tolerating w/o side effects     No falls/fractures since last visit. Hx stress fracture in the past.  Doing yoga to work on balance       DXA 9/2023 at [facility] (unable to see images, but we can see the report below):  Findings:   CT bone mineral density scans were completed of the axial skeleton at the lumbar spine and at both hips.      The mean bone mineral density in the measured levels of the L1-L4 vertebra is 0.914 g/cm2.  This is equal to an age-adjusted T score of -2.2.      The bone mineral density in the left femoral neck is 0.890 g/cm2.  This is equal to an age-adjusted T score of -1.1.     The bone mineral density in the right femoral neck is 0.845 g/cm2.  This is equal to an age-adjusted T score of -1.4.       1. Osteopenia in the mean of the measured levels of the L1-L4 vertebra. The mean bone mineral density has increased since the previous exam, when it was in the osteoporotic range.   2. Osteopenia in the left femoral neck. The bone mineral density has increased since the previous exam, when is in the osteopenic range.   3. Osteopenia in the right femoral neck. The bone mineral density has increased since the previous exam, when it was in the osteopenic range.     FRAX 10 year probability of fracture:   Major osteoporotic fracture: 6.5%   Hip fracture: 0.5%                  Secondary osteoporosis workup:   postmenopausal estrogen deficiency- menopause age 53  Lab Results   Component Value Date    CALCIUM 9.6 03/12/2024    ALBUMIN 4.6 03/12/2024    ESTGFRAFRICA 118 04/27/2021    EGFRNONAA 102 04/27/2021    PHOS 3.4 06/04/2022    ALKPHOS 62 03/24/2023    PTH 44 05/01/2021    TSH 1.66 03/12/2024    Vitamin D 44 6/2022    Previous Treatment:   Fosamax started 5/2021  History of previous fracture: Yes stress fracture in right leg, related to spinning?  Parent with fractured hip: No  Smoking status: no  Glucocorticoid use: No  History of RA: No  Alcohol 3 or more/day: No  Nephrolithiasis: Yes couple years ago had kidney stone once  Dental up to date: yes- no upcoming proceedure   Calcium - calcium citrate 600 mg daily with about 1 serving of dairy daily   Vitamin D: not taking additional D   GERD - taking famotidine     Height loss - 1/4 inch    Exercising regularly with walking, weights, elliptical    Review of patient's allergies indicates:  No Known Allergies      Current Outpatient Medications:     alendronate (FOSAMAX) 70 MG tablet, TAKE 1 TABLET BY MOUTH ONCE WEEKLY ON EMPTY STOMACH. DO NOT RECLINE FOR 60 MINUTES AFTER., Disp: 12 tablet, Rfl: 3    ALPRAZolam (XANAX) 0.25 MG tablet, Take 1 tablet (0.25 mg total) by mouth daily as needed for Anxiety., Disp: 30 tablet, Rfl: 0    ascorbic acid, vitamin C, 500 mg Chew, Take by mouth Daily., Disp: , Rfl:     bisoprolol (ZEBETA) 5 MG tablet, Take 0.5 tablets by mouth every morning., Disp: , Rfl:     calcium carbonate (OS-NICOLETTE) 600 mg calcium (1,500 mg) Tab, Take 600 mg by mouth once., Disp: , Rfl:     ENTRESTO 24-26 mg per tablet, , Disp: , Rfl:     EScitalopram oxalate (LEXAPRO) 10 MG tablet, TAKE ONE TABLET BY MOUTH EVERY DAY, Disp: 90 tablet, Rfl: 2    famotidine (PEPCID) 20 MG tablet, Take 20 mg by mouth Daily., Disp: , Rfl:     hydrOXYzine pamoate (VISTARIL) 25 MG Cap, Take 1 capsule (25 mg total) by mouth nightly as  "needed (insomnia)., Disp: 30 capsule, Rfl: 2    meloxicam (MOBIC) 15 MG tablet, Take 15 mg by mouth., Disp: , Rfl:           ROS:  See HPI    Objective:   Physical Exam   There were no vitals taken for this visit.  Wt Readings from Last 3 Encounters:   07/09/24 64.4 kg (141 lb 15.6 oz)   04/02/24 64 kg (141 lb 1.5 oz)   06/26/23 63.3 kg (139 lb 10.6 oz)   ]    Constitutional:  Pleasant,  in no acute distress.   HENT:   Eyes:     No scleral icterus.   Respiratory:   Effort normal   Neurological:  normal speech  Psych:  Normal mood and affect.        LABORATORY REVIEW:    Chemistry        Component Value Date/Time     03/12/2024 0757    K 4.3 03/12/2024 0757     03/12/2024 0757    CO2 27 03/12/2024 0757    BUN 14 03/12/2024 0757    BUN 13.0 03/24/2023 0857    CREATININE 0.56 03/12/2024 0757     (H) 03/12/2024 0757        Component Value Date/Time    CALCIUM 9.6 03/12/2024 0757    ALKPHOS 62 03/24/2023 0857    AST 16 03/12/2024 0757    ALT 18 03/12/2024 0757    BILITOT 0.4 03/12/2024 0757    ESTGFRAFRICA 118 04/27/2021 0758    EGFRNONAA 102 04/27/2021 0758        No results found for: "HGBA1C"    Assessment/Plan:     Problem List Items Addressed This Visit          Endocrine    Osteoporosis     Risks include low weight,  race, menopause, PPI therapy.      Reviewed basics of quantity versus quality  Reassuring she is not having fragility fractures (would not consider stress fracture to be related to osteoporosis)  Secondary workup negative    RDA of calcium (0534-2232 mg /day in divided doses)   Calcium from food sources preferred  Fall precautions emphasized  +weight bearing exercise  Check vitD    Continue fosamax as she is not having any GI side effects.  Last DXA with improvement in bmd.    Repeat [facility] at [facility] 9/[facility]25    Discussed optimal duration of bisphosphonate use is unknown - drug holiday after 5-10 po versus drug hold ay after 3 reclast treatment     Continue fosamax and rtc " in 1 year, sooner if clinical change  She will have basic labs checked by her PCP at her annual in a few months.         Relevant Medications    alendronate (FOSAMAX) 70 MG tablet    Other Relevant Orders    DXA Bone Density Axial Skeleton 1 or more sites       GI    GERD without esophagitis - Primary     Discussed that oral bisphosphonates can potentially exacerbate uncontrolled GERD however she is doing well and tolerating Fosamax without SE, so will continue.          Labs will be done with PCP in a few months  Return to clinic in 12 months    Visit today included increased complexity associated with the care of the problems addressed and managing the longitudinal care of the patient due to the serious and/or complex managed problems         Katie Mejia MD

## 2024-08-26 NOTE — ASSESSMENT & PLAN NOTE
Risks include low weight,  race, menopause, PPI therapy.      Reviewed basics of quantity versus quality  Reassuring she is not having fragility fractures (would not consider stress fracture to be related to osteoporosis)  Secondary workup negative    RDA of calcium (8475-1500 mg /day in divided doses)   Calcium from food sources preferred  Fall precautions emphasized  +weight bearing exercise  Check vitD    Continue fosamax as she is not having any GI side effects.  Last DXA with improvement in bmd.    Repeat DXA at [facility] in 9/2025    Discussed optimal duration of bisphosphonate use is unknown - drug holiday after 5-10 po versus drug hold ay after 3 reclast treatment     Continue fosamax and rtc in 1 year, sooner if clinical change  She will have basic labs checked by her PCP at her annual in a few months.

## 2024-08-26 NOTE — ASSESSMENT & PLAN NOTE
Discussed that oral bisphosphonates can potentially exacerbate uncontrolled GERD however she is doing well and tolerating Fosamax without SE, so will continue.

## 2024-09-05 ENCOUNTER — PATIENT MESSAGE (OUTPATIENT)
Dept: OBSTETRICS AND GYNECOLOGY | Facility: CLINIC | Age: 59
End: 2024-09-05
Payer: COMMERCIAL

## 2024-11-07 LAB — CRC RECOMMENDATION EXT: NORMAL

## 2024-11-08 ENCOUNTER — PATIENT OUTREACH (OUTPATIENT)
Dept: ADMINISTRATIVE | Facility: HOSPITAL | Age: 59
End: 2024-11-08
Payer: COMMERCIAL

## 2024-11-08 NOTE — PROGRESS NOTES
Health Maintenance Due   Topic Date Due    Shingles Vaccine (3 of 3) 01/24/2019    Influenza Vaccine (1) 09/01/2024    COVID-19 Vaccine (2 - 2024-25 season) 09/01/2024     Chart review completed. HM Updated. Triggered Links. Immunizations reviewed and updated. Care Everywhere Updated. Care Team Updated.  Imported outside colonoscopy results from [facility] into /media. Repeat colonoscopy in 5 years per provider recommendation.

## 2024-12-10 DIAGNOSIS — F41.9 ANXIETY: Primary | ICD-10-CM

## 2024-12-10 RX ORDER — ALPRAZOLAM 0.25 MG/1
0.25 TABLET ORAL DAILY PRN
Qty: 30 TABLET | Refills: 2 | Status: SHIPPED | OUTPATIENT
Start: 2024-12-10 | End: 2025-01-09

## 2024-12-10 NOTE — TELEPHONE ENCOUNTER
No care due was identified.  [facility] Embedded Care Due Messages. Reference number: [id number].   12/10/2024 9:48:31 AM CST

## 2025-01-06 ENCOUNTER — TELEPHONE (OUTPATIENT)
Dept: OBSTETRICS AND GYNECOLOGY | Facility: CLINIC | Age: 60
End: 2025-01-06
Payer: COMMERCIAL

## 2025-01-06 DIAGNOSIS — R92.8 ABNORMAL ULTRASOUND OF BREAST: Primary | ICD-10-CM

## 2025-01-31 ENCOUNTER — PATIENT MESSAGE (OUTPATIENT)
Dept: PRIMARY CARE CLINIC | Facility: CLINIC | Age: 60
End: 2025-01-31
Payer: COMMERCIAL

## 2025-01-31 DIAGNOSIS — Z12.39 ENCOUNTER FOR SCREENING FOR MALIGNANT NEOPLASM OF BREAST, UNSPECIFIED SCREENING MODALITY: Primary | ICD-10-CM

## 2025-01-31 NOTE — TELEPHONE ENCOUNTER
LOV with Eliud Gerard MD , 4/2/2024    Patient requesting mammogram     Orders pended for your review

## 2025-02-03 ENCOUNTER — PATIENT MESSAGE (OUTPATIENT)
Dept: PRIMARY CARE CLINIC | Facility: CLINIC | Age: 60
End: 2025-02-03
Payer: COMMERCIAL

## 2025-02-03 ENCOUNTER — TELEPHONE (OUTPATIENT)
Dept: PRIMARY CARE CLINIC | Facility: CLINIC | Age: 60
End: 2025-02-03
Payer: COMMERCIAL

## 2025-02-03 DIAGNOSIS — Z12.31 SCREENING MAMMOGRAM, ENCOUNTER FOR: ICD-10-CM

## 2025-02-03 NOTE — TELEPHONE ENCOUNTER
Mammogram ordered per written order guideline and faxed    12/6 US breast scanned result, states back on schedule

## 2025-02-03 NOTE — TELEPHONE ENCOUNTER
----- Message from Lorena sent at 2/3/2025  8:30 AM CST -----  Contact: [phone]  Pt is calling requesting for her Mammo to be sent to [facility] imaging center for this week as soon as possible.    Please call and advise

## 2025-02-05 ENCOUNTER — PATIENT MESSAGE (OUTPATIENT)
Dept: ENDOCRINOLOGY | Facility: CLINIC | Age: 60
End: 2025-02-05
Payer: COMMERCIAL

## 2025-02-14 DIAGNOSIS — M81.0 AGE-RELATED OSTEOPOROSIS WITHOUT CURRENT PATHOLOGICAL FRACTURE: ICD-10-CM

## 2025-02-14 RX ORDER — ALENDRONATE SODIUM 70 MG/1
TABLET ORAL
Qty: 12 TABLET | Refills: 3 | Status: SHIPPED | OUTPATIENT
Start: 2025-02-14

## 2025-04-28 ENCOUNTER — PATIENT MESSAGE (OUTPATIENT)
Dept: PRIMARY CARE CLINIC | Facility: CLINIC | Age: 60
End: 2025-04-28
Payer: COMMERCIAL

## 2025-04-28 DIAGNOSIS — Z00.00 ANNUAL PHYSICAL EXAM: ICD-10-CM

## 2025-04-28 DIAGNOSIS — Z00.00 ROUTINE GENERAL MEDICAL EXAMINATION AT A HEALTH CARE FACILITY: Primary | ICD-10-CM

## 2025-04-29 NOTE — TELEPHONE ENCOUNTER
LOV with Eliud Gerard MD , 4/2/2024  Requesting annual lab orders. I pended the same lab orders/assoc dxs as last year for your review.